# Patient Record
Sex: FEMALE | Race: WHITE | Employment: FULL TIME | URBAN - NONMETROPOLITAN AREA
[De-identification: names, ages, dates, MRNs, and addresses within clinical notes are randomized per-mention and may not be internally consistent; named-entity substitution may affect disease eponyms.]

---

## 2017-04-26 ENCOUNTER — OFFICE VISIT (OUTPATIENT)
Dept: ONCOLOGY | Age: 47
End: 2017-04-26

## 2017-04-26 VITALS
BODY MASS INDEX: 39.62 KG/M2 | WEIGHT: 237.8 LBS | HEIGHT: 65 IN | SYSTOLIC BLOOD PRESSURE: 132 MMHG | RESPIRATION RATE: 18 BRPM | HEART RATE: 97 BPM | OXYGEN SATURATION: 98 % | DIASTOLIC BLOOD PRESSURE: 89 MMHG | TEMPERATURE: 97.7 F

## 2017-04-26 DIAGNOSIS — C73 THYROID CANCER (HCC): Primary | ICD-10-CM

## 2017-04-26 PROCEDURE — 99244 OFF/OP CNSLTJ NEW/EST MOD 40: CPT | Performed by: INTERNAL MEDICINE

## 2017-04-26 RX ORDER — BENAZEPRIL HYDROCHLORIDE AND HYDROCHLOROTHIAZIDE 20; 12.5 MG/1; MG/1
1 TABLET ORAL DAILY
COMMUNITY
Start: 2017-04-15

## 2017-04-26 RX ORDER — OMEPRAZOLE 20 MG/1
20 CAPSULE, DELAYED RELEASE ORAL EVERY OTHER DAY
COMMUNITY
Start: 2017-03-29

## 2017-04-26 RX ORDER — HYDROCODONE BITARTRATE AND ACETAMINOPHEN 5; 325 MG/1; MG/1
TABLET ORAL
COMMUNITY
Start: 2017-04-04 | End: 2017-06-30

## 2017-04-26 RX ORDER — EZETIMIBE 10 MG/1
10 TABLET ORAL DAILY
COMMUNITY
Start: 2017-03-29

## 2017-04-26 RX ORDER — MULTIVIT-MIN/IRON/FOLIC ACID/K 18-600-40
1 CAPSULE ORAL DAILY
COMMUNITY

## 2017-04-26 RX ORDER — LEVOTHYROXINE SODIUM 0.07 MG/1
75 TABLET ORAL DAILY
COMMUNITY
Start: 2017-04-04 | End: 2017-06-30 | Stop reason: SDUPTHER

## 2017-04-26 RX ORDER — CALCIUM CARBONATE 500(1250)
500 TABLET ORAL DAILY
COMMUNITY
End: 2021-11-16 | Stop reason: ALTCHOICE

## 2017-04-26 RX ORDER — M-VIT,TX,IRON,MINS/CALC/FOLIC 27MG-0.4MG
1 TABLET ORAL DAILY
COMMUNITY

## 2017-04-26 RX ORDER — FERROUS SULFATE 325(65) MG
325 TABLET ORAL
COMMUNITY
End: 2017-06-30

## 2017-06-30 ENCOUNTER — OFFICE VISIT (OUTPATIENT)
Dept: ENDOCRINOLOGY | Age: 47
End: 2017-06-30

## 2017-06-30 VITALS
HEIGHT: 65 IN | HEART RATE: 81 BPM | SYSTOLIC BLOOD PRESSURE: 120 MMHG | BODY MASS INDEX: 40.48 KG/M2 | WEIGHT: 243 LBS | RESPIRATION RATE: 16 BRPM | DIASTOLIC BLOOD PRESSURE: 90 MMHG

## 2017-06-30 DIAGNOSIS — E89.0 POSTOPERATIVE HYPOTHYROIDISM: ICD-10-CM

## 2017-06-30 DIAGNOSIS — E78.00 PURE HYPERCHOLESTEROLEMIA: ICD-10-CM

## 2017-06-30 DIAGNOSIS — I10 ESSENTIAL HYPERTENSION: ICD-10-CM

## 2017-06-30 DIAGNOSIS — C73 THYROID CANCER (HCC): Primary | ICD-10-CM

## 2017-06-30 PROCEDURE — 99244 OFF/OP CNSLTJ NEW/EST MOD 40: CPT | Performed by: INTERNAL MEDICINE

## 2017-06-30 RX ORDER — LEVOTHYROXINE SODIUM 0.1 MG/1
100 TABLET ORAL DAILY
Qty: 30 TABLET | Refills: 5 | Status: SHIPPED | OUTPATIENT
Start: 2017-06-30 | End: 2017-07-05 | Stop reason: DRUGHIGH

## 2017-07-05 DIAGNOSIS — E89.0 POSTOPERATIVE HYPOTHYROIDISM: Primary | ICD-10-CM

## 2017-07-10 RX ORDER — LEVOTHYROXINE SODIUM 112 UG/1
112 TABLET ORAL DAILY
Qty: 30 TABLET | Refills: 3 | Status: SHIPPED | OUTPATIENT
Start: 2017-07-10 | End: 2017-08-10 | Stop reason: SDUPTHER

## 2017-08-03 LAB
T4 FREE: 1.2
TSH SERPL DL<=0.05 MIU/L-ACNC: 6.36 UIU/ML

## 2017-08-10 ENCOUNTER — TELEPHONE (OUTPATIENT)
Dept: ENDOCRINOLOGY | Age: 47
End: 2017-08-10

## 2017-08-10 DIAGNOSIS — C73 THYROID CANCER (HCC): Primary | ICD-10-CM

## 2017-08-10 DIAGNOSIS — E89.0 POSTOPERATIVE HYPOTHYROIDISM: ICD-10-CM

## 2017-08-10 DIAGNOSIS — E89.0 POSTOPERATIVE HYPOTHYROIDISM: Primary | ICD-10-CM

## 2017-08-10 RX ORDER — LEVOTHYROXINE SODIUM 137 UG/1
137 TABLET ORAL DAILY
Qty: 30 TABLET | Refills: 5 | Status: SHIPPED | OUTPATIENT
Start: 2017-08-10 | End: 2017-10-19 | Stop reason: SDUPTHER

## 2017-08-22 ENCOUNTER — TELEPHONE (OUTPATIENT)
Dept: ENDOCRINOLOGY | Age: 47
End: 2017-08-22

## 2017-08-22 DIAGNOSIS — E89.0 POSTOPERATIVE HYPOTHYROIDISM: Primary | ICD-10-CM

## 2017-09-27 DIAGNOSIS — C73 THYROID CANCER (HCC): ICD-10-CM

## 2017-10-19 ENCOUNTER — OFFICE VISIT (OUTPATIENT)
Dept: ENDOCRINOLOGY | Age: 47
End: 2017-10-19
Payer: COMMERCIAL

## 2017-10-19 VITALS
WEIGHT: 240.5 LBS | DIASTOLIC BLOOD PRESSURE: 72 MMHG | SYSTOLIC BLOOD PRESSURE: 130 MMHG | HEIGHT: 65 IN | RESPIRATION RATE: 20 BRPM | BODY MASS INDEX: 40.07 KG/M2 | HEART RATE: 70 BPM

## 2017-10-19 DIAGNOSIS — E78.00 PURE HYPERCHOLESTEROLEMIA: ICD-10-CM

## 2017-10-19 DIAGNOSIS — I10 ESSENTIAL HYPERTENSION: ICD-10-CM

## 2017-10-19 DIAGNOSIS — E89.0 POSTOPERATIVE HYPOTHYROIDISM: Primary | ICD-10-CM

## 2017-10-19 DIAGNOSIS — C73 THYROID CANCER (HCC): ICD-10-CM

## 2017-10-19 PROCEDURE — 99214 OFFICE O/P EST MOD 30 MIN: CPT | Performed by: INTERNAL MEDICINE

## 2017-10-19 RX ORDER — LEVOTHYROXINE SODIUM 137 UG/1
TABLET ORAL
Qty: 33 TABLET | Refills: 5 | Status: SHIPPED | OUTPATIENT
Start: 2017-10-19 | End: 2018-01-22 | Stop reason: SDUPTHER

## 2017-10-19 RX ORDER — CETIRIZINE HYDROCHLORIDE 10 MG/1
10 TABLET ORAL DAILY
COMMUNITY

## 2017-10-19 NOTE — LETTER
 KNEE SURGERY Right 09/1998    Lateral Release    MYOMECTOMY  12/2010    SEPTOPLASTY  05/2017    THYROIDECTOMY, COMPLETION  04/03/2017    [facility]    WISDOM TOOTH EXTRACTION  05/2003       Family History   Problem Relation Age of Onset    Diabetes Mother     Heart Disease Mother     Heart Attack Mother     Cancer Father      Esophageal Cancer,Kidney    Prostate Cancer Father     Thyroid Disease Father     High Blood Pressure Brother      Social History   Substance Use Topics    Smoking status: Never Smoker    Smokeless tobacco: Never Used    Alcohol use Yes      Current Outpatient Prescriptions   Medication Sig Dispense Refill    cetirizine (ZYRTEC ALLERGY) 10 MG tablet Take 10 mg by mouth daily      levonorgestrel (MIRENA) IUD 52 mg 1 each by Intrauterine route once      levothyroxine (SYNTHROID) 137 MCG tablet Take 1 tablet daily for 6 days and 1.5 tablets on day 7 33 tablet 5    omeprazole (PRILOSEC) 20 MG delayed release capsule Take 20 mg by mouth Daily       ezetimibe (ZETIA) 10 MG tablet Take 10 mg by mouth daily       benazepril-hydrochlorthiazide (LOTENSIN HCT) 20-12.5 MG per tablet Take 1 tablet by mouth daily       metFORMIN (GLUCOPHAGE) 500 MG tablet Take 500 mg by mouth 3 times daily      Multiple Vitamins-Minerals (THERAPEUTIC MULTIVITAMIN-MINERALS) tablet Take 1 tablet by mouth daily      calcium carbonate (OSCAL) 500 MG TABS tablet Take 500 mg by mouth daily      Cholecalciferol (VITAMIN D) 2000 UNITS CAPS capsule Take 1 capsule by mouth daily       No current facility-administered medications for this visit.       Allergies   Allergen Reactions    Codeine Hives    Statins     Seasonal      Health Maintenance   Topic Date Due    HIV screen  11/19/1987    DTaP/Tdap/Td vaccine (1 - Tdap) 11/19/1991    Cervical cancer screen  11/19/1993    Lipid screen  11/19/2012    Diabetes screen  11/19/2012    Flu vaccine (1) 09/01/2017       Labs  No results found for: Liam Guidry

## 2017-10-19 NOTE — LETTER
Endocrine Diabetes Metabolism [facility]  [address]  [address]  Phone: [phone]  Fax: [phone]    Alexy Donnelly MD      10/19/17    Dr. Cathy Préez    Patient: Johana Boyle  MR Number: [id number]  YOB: 1970  Date of Visit: 10/19/2017      Dear Dr. Cathy Pérez    Thank you for the request for consultation for Johana Boyle to me for the evaluation of   Chief Complaint   Patient presents with    Thyroid Problem     cancer    Hypothyroidism     post-op    Other     hypercholestrolemia    Hypertension    Results     09/07/17, US 09/29/17   . Below are the relevant portions of my assessment and plan of care. Subjective: Follow-up visit for this 30-year-old female being seen for thyroid cancer and hypothyroidism. She also has polycystic ovarian syndrome, hyperlipidemia and hypertension. 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 She did not receive radioactive iodine therapy. She is currently taking 137 µg of Synthroid and has been on this for about 2 months. Patient denies cold intolerance, fatigue and weight gain. There is no constipation, depression or hoarseness of the voice. She denies thyroid pain, choking and hoarseness of the voice. Recent labs were normal with TSH of 1.37. Thyroglobulin was 3.0 in August, when TSH was elevated at 6. Thyroid ultrasound was repeated, with no evidence of residual or recurrent thyroid disease. The patient is still on metformin for polycystic ovarian syndrome. Her periods have been irregular. Since the last visit she went to GYN and had Mirena implanted. She has been sporting since the implantation of the Mirena. Weight has been stable. Patient takes Lotensin for blood pressure.   Past Medical History:   Diagnosis Date    Cancer ([address]     Hypertension     Thyroid disease       Past Surgical History:   Procedure Laterality Date No results found for: EAG  Lab Results   Component Value Date    TSH 6.36 08/03/2017     No results found for: CHOL  No results found for: TRIG  No results found for: HDL  No results found for: LDLCALC, LDLCHOLESTEROL  No results found for: LABVLDL, VLDL  No results found for: CHOLHDLRATIO      Review of Systems  Constitutional: negative for chills and fevers  Eyes: negative for irritation, redness and visual disturbance  Respiratory: negative for cough, shortness of breath and wheezing  Cardiovascular: negative for chest pain, irregular heart beat and palpitations    The remainder of systems were reviewed and negative. Objective:   /72   Pulse 70   Resp 20   Ht 5' 4.96\" (1.65 m)   Wt 240 lb 8 oz (109.1 kg)   BMI 40.07 kg/m²      General:  alert, appears stated age, cooperative and no distress   Oropharynx: normal findings: lips normal without lesions, buccal mucosa normal, gums healthy and tongue midline and normal    Eyes:  negative findings: lids and lashes normal, conjunctivae and sclerae normal, corneas clear and pupils equal, round, reactive to light and accomodation       Neck: no adenopathy, no carotid bruit, no JVD and supple, symmetrical, trachea midline   Thyroid:  Healed thyroidectomy scar. Lung: clear to auscultation bilaterally   Heart:  regular rate and rhythm, S1, S2 normal, no murmur, click, rub or gallop and normal apical impulse   Abdomen: soft, non-tender; bowel sounds normal; no masses,  no organomegaly   Extremities: extremities normal, atraumatic, no cyanosis or edema and Homans sign is negative, no sign of DVT   Pulses: 2+ and symmetric   Skin: warm and dry, no hyperpigmentation, vitiligo, or suspicious lesions   Neuro: normal without focal findings, mental status, speech normal, alert and oriented x3, LAURA, cranial nerves 2-12 intact, muscle tone and strength normal and symmetric.        Assessment/Plan: 1. Postoperative hypothyroidism: Adequately replaced. Medication is well tolerated. 2. Thyroid cancer [facility]): Ultrasound report is reassuring. Thyroglobulin is still measurable and had like to get TSH around 0.5 for long-term control. Patient will now take an extra half tablet of Synthroid once a week. We will then check levels in a month. Patient to let me know if she gets signs or symptoms of thyrotoxicosis. We discussed surveillance. 3. Essential hypertension: Well-controlled. 4. Pure hypercholesterolemia : Patient cannot take statins but she is doing well on Zetia. Lipids and LFTs to be monitored periodically. Orders Placed This Encounter   Procedures    TSH without Reflex     Standing Status:   Future     Standing Expiration Date:   10/19/2018    T4, Free     Standing Status:   Future     Standing Expiration Date:   10/19/2018    TSH without Reflex     Standing Status:   Future     Standing Expiration Date:   10/19/2018    Thyroglobulin and anti-Thyroglobulin AB     Standing Status:   Future     Standing Expiration Date:   10/20/2018    T4, Free     Standing Status:   Future     Standing Expiration Date:   10/19/2018       · The risks and benefits of my recommendations, as well as other treatment options were discussed with the patient today. Questions were answered. · Follow up: 6 months and as needed. If you have questions, please do not hesitate to call me. I look forward to following Alec along with you.     Sincerely,        Americo Fishman MD

## 2017-10-19 NOTE — PROGRESS NOTES
[facility] PROFESSIONAL SERVS  ENDOCRINE DIABETES METABOLISM [facility]  [address]  [address]  [address] [facility]  Dept: [phone]  Dept Fax: [phone]: [phone]    Visit Date: 10/19/2017    Acacia Wong is a 42 y.o. female who presents today for:  Chief Complaint   Patient presents with    Thyroid Problem     cancer    Hypothyroidism     post-op    Other     hypercholestrolemia    Hypertension    Results     09/07/17, US 09/29/17            Subjective: Follow-up visit for this 51-year-old female being seen for thyroid cancer and hypothyroidism. She also has polycystic ovarian syndrome, hyperlipidemia and hypertension. 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 She did not receive radioactive iodine therapy. She is currently taking 137 µg of Synthroid and has been on this for about 2 months. Patient denies cold intolerance, fatigue and weight gain. There is no constipation, depression or hoarseness of the voice. She denies thyroid pain, choking and hoarseness of the voice. Recent labs were normal with TSH of 1.37. Thyroglobulin was 3.0 in August, when TSH was elevated at 6. Thyroid ultrasound was repeated, with no evidence of residual or recurrent thyroid disease. The patient is still on metformin for polycystic ovarian syndrome. Her periods have been irregular. Since the last visit she went to GYN and had Mirena implanted. She has been sporting since the implantation of the Mirena. Weight has been stable. Patient takes Lotensin for blood pressure.   Past Medical History:   Diagnosis Date    Cancer ([address]     Hypertension     Thyroid disease       Past Surgical History:   Procedure Laterality Date    KNEE SURGERY Right 09/1998    Lateral Release    MYOMECTOMY  12/2010    SEPTOPLASTY  05/2017    THYROIDECTOMY, COMPLETION  04/03/2017    [facility]    WISDOM TOOTH EXTRACTION  05/2003       Family History   Problem Relation Age of Onset    Diabetes Mother     Heart Disease Mother     Heart Attack Mother     Cancer Father      Esophageal Cancer,Kidney    Prostate Cancer Father     Thyroid Disease Father     High Blood Pressure Brother      Social History   Substance Use Topics    Smoking status: Never Smoker    Smokeless tobacco: Never Used    Alcohol use Yes      Current Outpatient Prescriptions   Medication Sig Dispense Refill    cetirizine (ZYRTEC ALLERGY) 10 MG tablet Take 10 mg by mouth daily      levonorgestrel (MIRENA) IUD 52 mg 1 each by Intrauterine route once      levothyroxine (SYNTHROID) 137 MCG tablet Take 1 tablet daily for 6 days and 1.5 tablets on day 7 33 tablet 5    omeprazole (PRILOSEC) 20 MG delayed release capsule Take 20 mg by mouth Daily       ezetimibe (ZETIA) 10 MG tablet Take 10 mg by mouth daily       benazepril-hydrochlorthiazide (LOTENSIN HCT) 20-12.5 MG per tablet Take 1 tablet by mouth daily       metFORMIN (GLUCOPHAGE) 500 MG tablet Take 500 mg by mouth 3 times daily      Multiple Vitamins-Minerals (THERAPEUTIC MULTIVITAMIN-MINERALS) tablet Take 1 tablet by mouth daily      calcium carbonate (OSCAL) 500 MG TABS tablet Take 500 mg by mouth daily      Cholecalciferol (VITAMIN D) 2000 UNITS CAPS capsule Take 1 capsule by mouth daily       No current facility-administered medications for this visit.       Allergies   Allergen Reactions    Codeine Hives    Statins     Seasonal      Health Maintenance   Topic Date Due    HIV screen  11/19/1987    DTaP/Tdap/Td vaccine (1 - Tdap) 11/19/1991    Cervical cancer screen  11/19/1993    Lipid screen  11/19/2012    Diabetes screen  11/19/2012    Flu vaccine (1) 09/01/2017       Labs  No results found for: LABA1C  No results found for: EAG  Lab Results   Component Value Date    TSH 6.36 08/03/2017     No results found for: CHOL  No results found for: TRIG  No results found for: HDL  No results found for: LDLCALC, LDLCHOLESTEROL  No results found for: LABVLDL, then check levels in a month. Patient to let me know if she gets signs or symptoms of thyrotoxicosis. We discussed surveillance. 3. Essential hypertension: Well-controlled. 4. Pure hypercholesterolemia : Patient cannot take statins but she is doing well on Zetia. Lipids and LFTs to be monitored periodically. Orders Placed This Encounter   Procedures    TSH without Reflex     Standing Status:   Future     Standing Expiration Date:   10/19/2018    T4, Free     Standing Status:   Future     Standing Expiration Date:   10/19/2018    TSH without Reflex     Standing Status:   Future     Standing Expiration Date:   10/19/2018    Thyroglobulin and anti-Thyroglobulin AB     Standing Status:   Future     Standing Expiration Date:   10/20/2018    T4, Free     Standing Status:   Future     Standing Expiration Date:   10/19/2018       · The risks and benefits of my recommendations, as well as other treatment options were discussed with the patient today. Questions were answered. · Follow up: 6 months and as needed. Electronically signed by Gustavus Paget, MD on 10/19/17 at 4:36 PM    **This report has been created using voice recognition software. It may   contain minor errors which are inherent in voice recognition technology. **

## 2017-10-19 NOTE — LETTER
Endocrine Diabetes Metabolism [facility]  [address]  [address]  Phone: [phone]  Fax: [phone]    Roro Willoughby MD      10/19/17    Dr. Yusuf Us    Patient: Solo Mckinney  MR Number: [id number]  YOB: 1970  Date of Visit: 10/19/2017      Dear Dr. Yusuf Us    Thank you for the request for consultation for Solo Mckinney to me for the evaluation of   Chief Complaint   Patient presents with    Thyroid Problem     cancer    Hypothyroidism     post-op    Other     hypercholestrolemia    Hypertension    Results     09/07/17, US 09/29/17   . Below are the relevant portions of my assessment and plan of care. Subjective: Follow-up visit for this 43-year-old female being seen for thyroid cancer and hypothyroidism. She also has polycystic ovarian syndrome, hyperlipidemia and hypertension. 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 She did not receive radioactive iodine therapy. She is currently taking 137 µg of Synthroid and has been on this for about 2 months. Patient denies cold intolerance, fatigue and weight gain. There is no constipation, depression or hoarseness of the voice. She denies thyroid pain, choking and hoarseness of the voice. Recent labs were normal with TSH of 1.37. Thyroglobulin was 3.0 in August, when TSH was elevated at 6. Thyroid ultrasound was repeated, with no evidence of residual or recurrent thyroid disease. The patient is still on metformin for polycystic ovarian syndrome. Her periods have been irregular. Since the last visit she went to GYN and had Mirena implanted. She has been sporting since the implantation of the Mirena. Weight has been stable. Patient takes Lotensin for blood pressure.   Past Medical History:   Diagnosis Date    Cancer ([address]     Hypertension     Thyroid disease       Past Surgical History:   Procedure Laterality Date  KNEE SURGERY Right 09/1998    Lateral Release    MYOMECTOMY  12/2010    SEPTOPLASTY  05/2017    THYROIDECTOMY, COMPLETION  04/03/2017    [facility]    WISDOM TOOTH EXTRACTION  05/2003       Family History   Problem Relation Age of Onset    Diabetes Mother     Heart Disease Mother     Heart Attack Mother     Cancer Father      Esophageal Cancer,Kidney    Prostate Cancer Father     Thyroid Disease Father     High Blood Pressure Brother      Social History   Substance Use Topics    Smoking status: Never Smoker    Smokeless tobacco: Never Used    Alcohol use Yes      Current Outpatient Prescriptions   Medication Sig Dispense Refill    cetirizine (ZYRTEC ALLERGY) 10 MG tablet Take 10 mg by mouth daily      levonorgestrel (MIRENA) IUD 52 mg 1 each by Intrauterine route once      levothyroxine (SYNTHROID) 137 MCG tablet Take 1 tablet daily for 6 days and 1.5 tablets on day 7 33 tablet 5    omeprazole (PRILOSEC) 20 MG delayed release capsule Take 20 mg by mouth Daily       ezetimibe (ZETIA) 10 MG tablet Take 10 mg by mouth daily       benazepril-hydrochlorthiazide (LOTENSIN HCT) 20-12.5 MG per tablet Take 1 tablet by mouth daily       metFORMIN (GLUCOPHAGE) 500 MG tablet Take 500 mg by mouth 3 times daily      Multiple Vitamins-Minerals (THERAPEUTIC MULTIVITAMIN-MINERALS) tablet Take 1 tablet by mouth daily      calcium carbonate (OSCAL) 500 MG TABS tablet Take 500 mg by mouth daily      Cholecalciferol (VITAMIN D) 2000 UNITS CAPS capsule Take 1 capsule by mouth daily       No current facility-administered medications for this visit.       Allergies   Allergen Reactions    Codeine Hives    Statins     Seasonal      Health Maintenance   Topic Date Due    HIV screen  11/19/1987    DTaP/Tdap/Td vaccine (1 - Tdap) 11/19/1991    Cervical cancer screen  11/19/1993    Lipid screen  11/19/2012    Diabetes screen  11/19/2012    Flu vaccine (1) 09/01/2017       Labs  No results found for: Abhilash Mackenzie

## 2018-01-22 ENCOUNTER — OFFICE VISIT (OUTPATIENT)
Dept: ONCOLOGY | Age: 48
End: 2018-01-22
Payer: COMMERCIAL

## 2018-01-22 ENCOUNTER — HOSPITAL ENCOUNTER (OUTPATIENT)
Dept: INFUSION THERAPY | Age: 48
Discharge: HOME | End: 2018-01-22
Payer: COMMERCIAL

## 2018-01-22 VITALS
HEART RATE: 90 BPM | BODY MASS INDEX: 40.62 KG/M2 | HEIGHT: 65 IN | DIASTOLIC BLOOD PRESSURE: 96 MMHG | OXYGEN SATURATION: 98 % | TEMPERATURE: 98.2 F | WEIGHT: 243.8 LBS | SYSTOLIC BLOOD PRESSURE: 145 MMHG | RESPIRATION RATE: 18 BRPM

## 2018-01-22 DIAGNOSIS — E89.0 POSTOPERATIVE HYPOTHYROIDISM: ICD-10-CM

## 2018-01-22 DIAGNOSIS — C73 THYROID CANCER (HCC): ICD-10-CM

## 2018-01-22 DIAGNOSIS — I10 HYPERTENSION, UNSPECIFIED TYPE: Primary | ICD-10-CM

## 2018-01-22 LAB
T4 FREE: 1.59 NG/DL (ref 0.93–1.76)
TSH SERPL DL<=0.05 MIU/L-ACNC: 0.32 UIU/ML (ref 0.4–4.2)

## 2018-01-22 PROCEDURE — 84439 ASSAY OF FREE THYROXINE: CPT

## 2018-01-22 PROCEDURE — 84432 ASSAY OF THYROGLOBULIN: CPT

## 2018-01-22 PROCEDURE — 36415 COLL VENOUS BLD VENIPUNCTURE: CPT

## 2018-01-22 PROCEDURE — 84443 ASSAY THYROID STIM HORMONE: CPT

## 2018-01-22 PROCEDURE — 99213 OFFICE O/P EST LOW 20 MIN: CPT | Performed by: INTERNAL MEDICINE

## 2018-01-22 PROCEDURE — 99211 OFF/OP EST MAY X REQ PHY/QHP: CPT

## 2018-01-22 RX ORDER — LEVOTHYROXINE SODIUM 137 UG/1
TABLET ORAL
Qty: 33 TABLET | Refills: 5 | Status: SHIPPED | OUTPATIENT
Start: 2018-01-22 | End: 2018-04-17 | Stop reason: SDUPTHER

## 2018-01-27 LAB — THYROGLOBULIN: 1.4 NG/ML (ref 1.3–31.8)

## 2018-03-26 LAB
T4 FREE: 1.48
TSH SERPL DL<=0.05 MIU/L-ACNC: 1.89 UIU/ML

## 2018-04-17 ENCOUNTER — OFFICE VISIT (OUTPATIENT)
Dept: ENDOCRINOLOGY | Age: 48
End: 2018-04-17
Payer: COMMERCIAL

## 2018-04-17 VITALS
WEIGHT: 246 LBS | DIASTOLIC BLOOD PRESSURE: 77 MMHG | RESPIRATION RATE: 16 BRPM | HEART RATE: 96 BPM | BODY MASS INDEX: 40.98 KG/M2 | SYSTOLIC BLOOD PRESSURE: 137 MMHG | HEIGHT: 65 IN

## 2018-04-17 DIAGNOSIS — C73 THYROID CANCER (HCC): Primary | ICD-10-CM

## 2018-04-17 DIAGNOSIS — E89.0 POSTOPERATIVE HYPOTHYROIDISM: ICD-10-CM

## 2018-04-17 PROCEDURE — 99214 OFFICE O/P EST MOD 30 MIN: CPT | Performed by: INTERNAL MEDICINE

## 2018-04-17 RX ORDER — LEVOTHYROXINE SODIUM 137 UG/1
TABLET ORAL
Qty: 35 TABLET | Refills: 5 | Status: SHIPPED | OUTPATIENT
Start: 2018-04-17 | End: 2021-04-08 | Stop reason: SDUPTHER

## 2018-04-17 ASSESSMENT — ENCOUNTER SYMPTOMS
HEARTBURN: 0
NAUSEA: 0
WHEEZING: 0
SHORTNESS OF BREATH: 0
SORE THROAT: 0
VOMITING: 0

## 2018-04-23 ENCOUNTER — HOSPITAL ENCOUNTER (OUTPATIENT)
Dept: INFUSION THERAPY | Age: 48
Discharge: HOME | End: 2018-04-23
Payer: COMMERCIAL

## 2018-04-23 ENCOUNTER — OFFICE VISIT (OUTPATIENT)
Dept: ONCOLOGY | Age: 48
End: 2018-04-23
Payer: COMMERCIAL

## 2018-04-23 VITALS
DIASTOLIC BLOOD PRESSURE: 85 MMHG | HEIGHT: 65 IN | OXYGEN SATURATION: 98 % | TEMPERATURE: 98.1 F | RESPIRATION RATE: 18 BRPM | WEIGHT: 247 LBS | HEART RATE: 102 BPM | BODY MASS INDEX: 41.15 KG/M2 | SYSTOLIC BLOOD PRESSURE: 134 MMHG

## 2018-04-23 DIAGNOSIS — C73 THYROID CANCER (HCC): ICD-10-CM

## 2018-04-23 DIAGNOSIS — C73 THYROID CANCER (HCC): Primary | ICD-10-CM

## 2018-04-23 DIAGNOSIS — E89.0 POSTOPERATIVE HYPOTHYROIDISM: ICD-10-CM

## 2018-04-23 LAB
T4 FREE: 1.53 NG/DL (ref 0.93–1.76)
TSH SERPL DL<=0.05 MIU/L-ACNC: 0.54 UIU/ML (ref 0.4–4.2)

## 2018-04-23 PROCEDURE — 84439 ASSAY OF FREE THYROXINE: CPT

## 2018-04-23 PROCEDURE — 99213 OFFICE O/P EST LOW 20 MIN: CPT | Performed by: INTERNAL MEDICINE

## 2018-04-23 PROCEDURE — 84443 ASSAY THYROID STIM HORMONE: CPT

## 2018-04-23 PROCEDURE — 99211 OFF/OP EST MAY X REQ PHY/QHP: CPT

## 2018-04-23 PROCEDURE — 84432 ASSAY OF THYROGLOBULIN: CPT

## 2018-04-23 PROCEDURE — 1036F TOBACCO NON-USER: CPT | Performed by: INTERNAL MEDICINE

## 2018-04-23 PROCEDURE — G8417 CALC BMI ABV UP PARAM F/U: HCPCS | Performed by: INTERNAL MEDICINE

## 2018-04-23 PROCEDURE — 36415 COLL VENOUS BLD VENIPUNCTURE: CPT

## 2018-04-23 PROCEDURE — G8427 DOCREV CUR MEDS BY ELIG CLIN: HCPCS | Performed by: INTERNAL MEDICINE

## 2018-04-25 ENCOUNTER — HOSPITAL ENCOUNTER (OUTPATIENT)
Dept: ULTRASOUND IMAGING | Age: 48
Discharge: HOME | End: 2018-04-25
Payer: COMMERCIAL

## 2018-04-25 DIAGNOSIS — E89.0 POSTOPERATIVE HYPOTHYROIDISM: ICD-10-CM

## 2018-04-25 DIAGNOSIS — C73 THYROID CANCER (HCC): ICD-10-CM

## 2018-04-25 PROCEDURE — 76536 US EXAM OF HEAD AND NECK: CPT

## 2018-04-26 ENCOUNTER — TELEPHONE (OUTPATIENT)
Dept: INTERNAL MEDICINE CLINIC | Age: 48
End: 2018-04-26

## 2018-04-26 DIAGNOSIS — C73 THYROID CANCER (HCC): Primary | ICD-10-CM

## 2018-04-29 LAB — THYROGLOBULIN: 1.8 NG/ML (ref 1.3–31.8)

## 2018-05-01 ENCOUNTER — TELEPHONE (OUTPATIENT)
Dept: INTERNAL MEDICINE CLINIC | Age: 48
End: 2018-05-01

## 2018-05-10 ENCOUNTER — HOSPITAL ENCOUNTER (OUTPATIENT)
Dept: ULTRASOUND IMAGING | Age: 48
Discharge: HOME | End: 2018-05-10
Payer: COMMERCIAL

## 2018-05-10 DIAGNOSIS — C73 THYROID CANCER (HCC): ICD-10-CM

## 2018-05-10 PROCEDURE — 76536 US EXAM OF HEAD AND NECK: CPT

## 2018-05-15 ENCOUNTER — TELEPHONE (OUTPATIENT)
Dept: ENDOCRINOLOGY | Age: 48
End: 2018-05-15

## 2018-05-21 LAB
T4 FREE: 1.36
TSH SERPL DL<=0.05 MIU/L-ACNC: 1.16 UIU/ML

## 2018-07-23 ENCOUNTER — HOSPITAL ENCOUNTER (OUTPATIENT)
Dept: INFUSION THERAPY | Age: 48
Discharge: HOME | End: 2018-07-23
Payer: COMMERCIAL

## 2018-07-23 ENCOUNTER — OFFICE VISIT (OUTPATIENT)
Dept: ONCOLOGY | Age: 48
End: 2018-07-23
Payer: COMMERCIAL

## 2018-07-23 VITALS
HEIGHT: 65 IN | SYSTOLIC BLOOD PRESSURE: 118 MMHG | BODY MASS INDEX: 40.75 KG/M2 | HEART RATE: 82 BPM | DIASTOLIC BLOOD PRESSURE: 78 MMHG | RESPIRATION RATE: 16 BRPM | OXYGEN SATURATION: 95 % | WEIGHT: 244.6 LBS | TEMPERATURE: 98 F

## 2018-07-23 DIAGNOSIS — C73 THYROID CANCER (HCC): ICD-10-CM

## 2018-07-23 DIAGNOSIS — C73 THYROID CANCER (HCC): Primary | ICD-10-CM

## 2018-07-23 LAB — TSH SERPL DL<=0.05 MIU/L-ACNC: 0.04 UIU/ML (ref 0.4–4.2)

## 2018-07-23 PROCEDURE — 84436 ASSAY OF TOTAL THYROXINE: CPT

## 2018-07-23 PROCEDURE — G8427 DOCREV CUR MEDS BY ELIG CLIN: HCPCS | Performed by: INTERNAL MEDICINE

## 2018-07-23 PROCEDURE — G8417 CALC BMI ABV UP PARAM F/U: HCPCS | Performed by: INTERNAL MEDICINE

## 2018-07-23 PROCEDURE — 99213 OFFICE O/P EST LOW 20 MIN: CPT | Performed by: INTERNAL MEDICINE

## 2018-07-23 PROCEDURE — 36415 COLL VENOUS BLD VENIPUNCTURE: CPT

## 2018-07-23 PROCEDURE — 84443 ASSAY THYROID STIM HORMONE: CPT

## 2018-07-23 PROCEDURE — 99211 OFF/OP EST MAY X REQ PHY/QHP: CPT

## 2018-07-23 PROCEDURE — 1036F TOBACCO NON-USER: CPT | Performed by: INTERNAL MEDICINE

## 2018-07-23 PROCEDURE — 84432 ASSAY OF THYROGLOBULIN: CPT

## 2018-07-24 LAB — THYROXINE (T4): 12.5 UG/DL (ref 4.5–12)

## 2018-07-28 LAB — THYROGLOBULIN: 1.6 NG/ML (ref 1.3–31.8)

## 2018-08-14 ENCOUNTER — HOSPITAL ENCOUNTER (OUTPATIENT)
Dept: ULTRASOUND IMAGING | Age: 48
Discharge: HOME | End: 2018-08-14
Payer: COMMERCIAL

## 2018-08-14 DIAGNOSIS — C73 MALIGNANT NEOPLASM OF THYROID GLAND (HCC): ICD-10-CM

## 2018-08-14 PROCEDURE — 76536 US EXAM OF HEAD AND NECK: CPT

## 2018-11-12 ENCOUNTER — HOSPITAL ENCOUNTER (OUTPATIENT)
Dept: INFUSION THERAPY | Age: 48
Discharge: HOME | End: 2018-11-12
Payer: COMMERCIAL

## 2018-11-12 ENCOUNTER — OFFICE VISIT (OUTPATIENT)
Dept: ONCOLOGY | Age: 48
End: 2018-11-12
Payer: COMMERCIAL

## 2018-11-12 VITALS
WEIGHT: 244.6 LBS | HEIGHT: 65 IN | SYSTOLIC BLOOD PRESSURE: 118 MMHG | HEART RATE: 85 BPM | TEMPERATURE: 98.4 F | DIASTOLIC BLOOD PRESSURE: 76 MMHG | BODY MASS INDEX: 40.75 KG/M2 | OXYGEN SATURATION: 98 % | RESPIRATION RATE: 18 BRPM

## 2018-11-12 DIAGNOSIS — C73 THYROID CANCER (HCC): ICD-10-CM

## 2018-11-12 DIAGNOSIS — C73 THYROID CANCER (HCC): Primary | ICD-10-CM

## 2018-11-12 LAB
BASOPHILS # BLD: 0.9 %
BASOPHILS ABSOLUTE: 0.1 THOU/MM3 (ref 0–0.1)
EOSINOPHIL # BLD: 3 %
EOSINOPHILS ABSOLUTE: 0.3 THOU/MM3 (ref 0–0.4)
HCT VFR BLD CALC: 38.9 % (ref 37–47)
HEMOGLOBIN: 12.8 GM/DL (ref 12–16)
IMMATURE GRANS (ABS): 0.04 THOU/MM3 (ref 0–0.07)
IMMATURE GRANULOCYTES: 0.4 %
LYMPHOCYTES # BLD: 33.5 %
LYMPHOCYTES ABSOLUTE: 2.9 THOU/MM3 (ref 1–4.8)
MCH RBC QN AUTO: 26.4 PG (ref 27–31)
MCHC RBC AUTO-ENTMCNC: 32.9 GM/DL (ref 33–37)
MCV RBC AUTO: 80 FL (ref 81–99)
MONOCYTES # BLD: 7 %
MONOCYTES ABSOLUTE: 0.6 THOU/MM3 (ref 0.4–1.3)
NUCLEATED RED BLOOD CELLS: 0 /100 WBC
PDW BLD-RTO: 14.1 % (ref 11.5–14.5)
PLATELET # BLD: 406 THOU/MM3 (ref 130–400)
PMV BLD AUTO: 8.3 FL (ref 7.4–10.4)
RBC # BLD: 4.83 MILL/MM3 (ref 4.2–5.4)
SEG NEUTROPHILS: 55.2 %
SEGMENTED NEUTROPHILS ABSOLUTE COUNT: 4.8 THOU/MM3 (ref 1.8–7.7)
WBC # BLD: 8.7 THOU/MM3 (ref 4.8–10.8)

## 2018-11-12 PROCEDURE — 84432 ASSAY OF THYROGLOBULIN: CPT

## 2018-11-12 PROCEDURE — 85025 COMPLETE CBC W/AUTO DIFF WBC: CPT

## 2018-11-12 PROCEDURE — G8427 DOCREV CUR MEDS BY ELIG CLIN: HCPCS | Performed by: INTERNAL MEDICINE

## 2018-11-12 PROCEDURE — G8484 FLU IMMUNIZE NO ADMIN: HCPCS | Performed by: INTERNAL MEDICINE

## 2018-11-12 PROCEDURE — 36415 COLL VENOUS BLD VENIPUNCTURE: CPT

## 2018-11-12 PROCEDURE — 99211 OFF/OP EST MAY X REQ PHY/QHP: CPT

## 2018-11-12 PROCEDURE — 1036F TOBACCO NON-USER: CPT | Performed by: INTERNAL MEDICINE

## 2018-11-12 PROCEDURE — 99213 OFFICE O/P EST LOW 20 MIN: CPT | Performed by: INTERNAL MEDICINE

## 2018-11-12 PROCEDURE — G8417 CALC BMI ABV UP PARAM F/U: HCPCS | Performed by: INTERNAL MEDICINE

## 2018-11-18 LAB — THYROGLOBULIN: 1.8 NG/ML (ref 1.3–31.8)

## 2018-11-18 NOTE — PROGRESS NOTES
and allergy list as noted on the electronic medical record. The PMH, SH and FH were also reviewed as noted on the EMR. Review of Systems  Constitutional: Negative. HENT: Negative. Eyes: Negative. Respiratory: Negative. Cardiovascular: Negative. Gastrointestinal: Negative. Genitourinary: Negative. Musculoskeletal: Negative. Skin: Negative. Neurological: Negative. Hematological: Negative. Psychiatric/Behavioral: Negative. Objective:   Physical Exam  Vitals:    11/12/18 1518   BP: 118/76   Pulse: 85   Resp: 18   Temp: 98.4 °F (36.9 °C)   SpO2: 98%   Vitals reviewed and are stable. Constitutional: Well-developed and well-nourished. No acute distress. HENT: Normocephalic and atraumatic. Eyes: Pupils are equal and reactive. No scleral icterus. Neck: Overall appearance is symmetrical. No identifiable masses. Chest: Inspection and palpation of chest is normal.  Pulmonary: Effort normal. No respiratory distress. Cardiovascular: RRR. No edema in any of the four extremities. Abdominal: Soft. No hepatomegaly or splenomegaly. Musculoskeletal: Gait is normal. Muscle strength and tone good. Neurological: Alert and oriented to person, place, and time. Judgment and thought content normal.  Skin: Skin is warm and dry. No rash. Psychiatric: Mood and affect appropriate for the clinical situation. Behavior is normal.        Data Analysis:    Hematology f11/12/2018   WBC 8.7   RBC 4.83   HGB 12.8   HCT 38.9   MCV 80 (L)   RDW 14.1    (H)     Assessment:   1. Papillary thyroid carcinoma - stage I.  2.  Thrombocytosis. Recommendations:   1. Monitor for recurrence of malignancy. 2.  Monitor platelet count and for any signs of abnormal bleeding/bruising.      Yanira Roberson [address] [facility] [facility], [address] [place] [address]

## 2019-09-11 ENCOUNTER — HOSPITAL ENCOUNTER (OUTPATIENT)
Dept: ULTRASOUND IMAGING | Age: 49
Discharge: HOME | End: 2019-09-11
Payer: COMMERCIAL

## 2019-09-11 DIAGNOSIS — C73 MALIGNANT NEOPLASM OF THYROID GLAND (HCC): ICD-10-CM

## 2019-09-11 PROCEDURE — 76536 US EXAM OF HEAD AND NECK: CPT

## 2019-11-12 ENCOUNTER — HOSPITAL ENCOUNTER (OUTPATIENT)
Dept: INFUSION THERAPY | Age: 49
Discharge: HOME | End: 2019-11-12
Payer: COMMERCIAL

## 2019-11-12 ENCOUNTER — OFFICE VISIT (OUTPATIENT)
Dept: ONCOLOGY | Age: 49
End: 2019-11-12
Payer: COMMERCIAL

## 2019-11-12 VITALS
TEMPERATURE: 99 F | BODY MASS INDEX: 35.96 KG/M2 | HEART RATE: 88 BPM | RESPIRATION RATE: 16 BRPM | HEIGHT: 65 IN | OXYGEN SATURATION: 97 % | WEIGHT: 215.8 LBS | DIASTOLIC BLOOD PRESSURE: 96 MMHG | SYSTOLIC BLOOD PRESSURE: 138 MMHG

## 2019-11-12 DIAGNOSIS — C73 THYROID CANCER (HCC): ICD-10-CM

## 2019-11-12 DIAGNOSIS — C73 THYROID CANCER (HCC): Primary | ICD-10-CM

## 2019-11-12 LAB
ALBUMIN SERPL-MCNC: 4.8 G/DL (ref 3.5–5.1)
ALP BLD-CCNC: 72 U/L (ref 38–126)
ALT SERPL-CCNC: 13 U/L (ref 11–66)
ANION GAP SERPL CALCULATED.3IONS-SCNC: 16 MEQ/L (ref 8–16)
AST SERPL-CCNC: 13 U/L (ref 5–40)
BASOPHILS # BLD: 0.6 %
BASOPHILS ABSOLUTE: 0.1 THOU/MM3 (ref 0–0.1)
BILIRUB SERPL-MCNC: 0.6 MG/DL (ref 0.3–1.2)
BILIRUBIN DIRECT: < 0.2 MG/DL (ref 0–0.3)
BUN BLDV-MCNC: 13 MG/DL (ref 7–22)
CALCIUM SERPL-MCNC: 9.6 MG/DL (ref 8.5–10.5)
CHLORIDE BLD-SCNC: 96 MEQ/L (ref 98–111)
CO2: 24 MEQ/L (ref 23–33)
CREAT SERPL-MCNC: 0.6 MG/DL (ref 0.4–1.2)
EOSINOPHIL # BLD: 1.1 %
EOSINOPHILS ABSOLUTE: 0.1 THOU/MM3 (ref 0–0.4)
GFR SERPL CREATININE-BSD FRML MDRD: > 90 ML/MIN/1.73M2
GLUCOSE BLD-MCNC: 85 MG/DL (ref 70–108)
HCT VFR BLD CALC: 38.7 % (ref 37–47)
HEMOGLOBIN: 12.3 GM/DL (ref 12–16)
IMMATURE GRANS (ABS): 0.04 THOU/MM3 (ref 0–0.07)
IMMATURE GRANULOCYTES: 0.4 %
LYMPHOCYTES # BLD: 26 %
LYMPHOCYTES ABSOLUTE: 2.5 THOU/MM3 (ref 1–4.8)
MCH RBC QN AUTO: 26.7 PG (ref 27–31)
MCHC RBC AUTO-ENTMCNC: 31.9 GM/DL (ref 33–37)
MCV RBC AUTO: 84 FL (ref 81–99)
MONOCYTES # BLD: 6.3 %
MONOCYTES ABSOLUTE: 0.6 THOU/MM3 (ref 0.4–1.3)
NUCLEATED RED BLOOD CELLS: 0 /100 WBC
PDW BLD-RTO: 14.4 % (ref 11.5–14.5)
PLATELET # BLD: 364 THOU/MM3 (ref 130–400)
PMV BLD AUTO: 8.1 FL (ref 7.4–10.4)
POTASSIUM SERPL-SCNC: 3.5 MEQ/L (ref 3.5–5.2)
RBC # BLD: 4.62 MILL/MM3 (ref 4.2–5.4)
SEG NEUTROPHILS: 65.6 %
SEGMENTED NEUTROPHILS ABSOLUTE COUNT: 6.3 THOU/MM3 (ref 1.8–7.7)
SODIUM BLD-SCNC: 136 MEQ/L (ref 135–145)
TOTAL PROTEIN: 8.1 G/DL (ref 6.1–8)
TSH SERPL DL<=0.05 MIU/L-ACNC: 1.69 UIU/ML (ref 0.4–4.2)
WBC # BLD: 9.6 THOU/MM3 (ref 4.8–10.8)

## 2019-11-12 PROCEDURE — 85025 COMPLETE CBC W/AUTO DIFF WBC: CPT

## 2019-11-12 PROCEDURE — 36415 COLL VENOUS BLD VENIPUNCTURE: CPT

## 2019-11-12 PROCEDURE — 80053 COMPREHEN METABOLIC PANEL: CPT

## 2019-11-12 PROCEDURE — 84432 ASSAY OF THYROGLOBULIN: CPT

## 2019-11-12 PROCEDURE — 99211 OFF/OP EST MAY X REQ PHY/QHP: CPT

## 2019-11-12 PROCEDURE — 84436 ASSAY OF TOTAL THYROXINE: CPT

## 2019-11-12 PROCEDURE — 84443 ASSAY THYROID STIM HORMONE: CPT

## 2019-11-12 PROCEDURE — 99213 OFFICE O/P EST LOW 20 MIN: CPT | Performed by: INTERNAL MEDICINE

## 2019-11-12 PROCEDURE — 82248 BILIRUBIN DIRECT: CPT

## 2019-11-13 LAB — THYROXINE (T4): 10.1 UG/DL (ref 4.5–12)

## 2019-11-23 LAB — THYROGLOBULIN: 2.6 NG/ML (ref 1.3–31.8)

## 2020-03-23 ENCOUNTER — HOSPITAL ENCOUNTER (OUTPATIENT)
Dept: NUCLEAR MEDICINE | Age: 50
Discharge: HOME | End: 2020-03-23
Payer: COMMERCIAL

## 2020-03-23 ENCOUNTER — APPOINTMENT (OUTPATIENT)
Dept: NUCLEAR MEDICINE | Age: 50
End: 2020-03-23
Payer: COMMERCIAL

## 2020-03-23 PROCEDURE — 6360000002 HC RX W HCPCS: Performed by: INTERNAL MEDICINE

## 2020-03-23 PROCEDURE — 78018 THYROID MET IMAGING BODY: CPT

## 2020-03-23 RX ADMIN — THYROTROPIN ALFA 0.9 MG: 0.9 INJECTION, POWDER, FOR SOLUTION INTRAMUSCULAR at 07:19

## 2020-03-24 ENCOUNTER — HOSPITAL ENCOUNTER (OUTPATIENT)
Dept: ULTRASOUND IMAGING | Age: 50
Discharge: HOME | End: 2020-03-24
Payer: COMMERCIAL

## 2020-03-24 ENCOUNTER — HOSPITAL ENCOUNTER (OUTPATIENT)
Dept: NUCLEAR MEDICINE | Age: 50
Discharge: HOME | End: 2020-03-24
Payer: COMMERCIAL

## 2020-03-24 PROCEDURE — 3209999900 US COMPARISON OF OUTSIDE FILMS

## 2020-03-24 PROCEDURE — 6360000002 HC RX W HCPCS: Performed by: INTERNAL MEDICINE

## 2020-03-24 RX ADMIN — THYROTROPIN ALFA 0.9 MG: 0.9 INJECTION, POWDER, FOR SOLUTION INTRAMUSCULAR at 07:25

## 2020-03-25 ENCOUNTER — HOSPITAL ENCOUNTER (OUTPATIENT)
Dept: NUCLEAR MEDICINE | Age: 50
Discharge: HOME | End: 2020-03-25
Payer: COMMERCIAL

## 2020-03-25 PROCEDURE — A9509 IODINE I-123 SOD IODIDE MIL: HCPCS | Performed by: INTERNAL MEDICINE

## 2020-03-25 PROCEDURE — 3430000000 HC RX DIAGNOSTIC RADIOPHARMACEUTICAL: Performed by: INTERNAL MEDICINE

## 2020-03-25 RX ADMIN — Medication 2.4 MILLICURIE: at 07:00

## 2020-03-26 ENCOUNTER — HOSPITAL ENCOUNTER (OUTPATIENT)
Dept: NUCLEAR MEDICINE | Age: 50
Discharge: HOME | End: 2020-03-26
Payer: COMMERCIAL

## 2020-03-27 ENCOUNTER — HOSPITAL ENCOUNTER (OUTPATIENT)
Dept: NUCLEAR MEDICINE | Age: 50
Discharge: HOME | End: 2020-03-27
Payer: COMMERCIAL

## 2020-11-05 NOTE — PROGRESS NOTES
[facility]  CANCER NETWORK OF [facility]  [facility] [address] [facility] [address]  [address] [facility] [address]  [address]  Dept: [phone]  Dept Fax: [phone]  Loc: [phone]    Subjective:      Chief Complaint: Balaji Rodas is a 54 y.o. female. The patient noticed a thyroid nodule and sought medical care. She first noticed the nodule in August 2016. The nodule was located in the left thyroid. The patient had no other associated sings and symptoms related to the thyroid nodule. In the context of this condition, she was also noted to have normal thyroid function. The patient had an ultrasound which confirmed the left nodule. She underwent a aspiration and was found to have a follicular thyroid. An ultrasound was completed on March 1, 2017 which found a large thyroid adenoma in the left lower thyroid which had slightly increased in size as compared to August 2016. There were 2 smaller nodules noted in the right thyroid which were well-defined and likely represented colloid nodules. The patient was seen by Dr. Jillian Cook and on April 3, 2017 she underwent a total thyroidectomy. Surgical pathology confirmed papillary thyroid carcinoma involving the left thyroid. The maximum tumor size was 5 mm. Margins were free of any neoplasia and she was also noted to have a multinodular goiter. One lymph node was negative for malignancy    HPI: Jillian Robertson is here today for follow-up regarding history of thyroid cancer. Her general sense of wellbeing has been good. She has no signs or symptoms that be suggestive recurrence of her malignancy. Patient continues to have her thyroid function monitored closely. She has not had fever, cough, shortness of breath or other signs of infection. The patient's bowel and bladder habits have been normal.  She has not seen blood in her stool or urine.   The patient remains active with an ECOG performance status of level 0. PMH, SH, and FH:  I reviewed the patients medication list and allergy list as noted on the electronic medical record. The PMH, SH and FH were also reviewed as noted on the EMR. Review of Systems  Constitutional: Negative. HENT: Negative. Eyes: Negative. Respiratory: Negative. Cardiovascular: Negative. Gastrointestinal: Negative. Genitourinary: Negative. Musculoskeletal: Negative. Skin: Negative. Neurological: Negative. Hematological: Negative. Psychiatric/Behavioral: Negative. Objective:   Physical Exam  Vitals:    11/10/20 1510   BP: (!) 145/86   Pulse: 82   Resp: 18   Temp: 99.2 °F (37.3 °C)   SpO2: 98%   Vitals reviewed and are stable. Constitutional: Well-developed. No acute distress. HENT: Normocephalic and atraumatic. Eyes: Pupils appear equal and reactive. Neck: Overall appearance is symmetrical. No identifiable masses. Pulmonary: Effort normal. No respiratory distress. .  Neurological: Alert and oriented to person, place, and time. Judgment and thought content normal.  Skin: Skin is warm and dry. No rash. Psychiatric: Mood and affect appropriate for the clinical situation. Assessment:   1. Papillary thyroid carcinoma - stage I. Recommendations:   1. Monitor for recurrence of malignancy. 2.  Monitor thyroid function. Kiran Valladares M.D. Medical Director: [facility]  Cancer [address], [address] [place] [address] of [facility] at [facility]      **This report has been created using voice recognition software.   It may contain minor errors which are inherent in voice recognition

## 2020-11-10 ENCOUNTER — OFFICE VISIT (OUTPATIENT)
Dept: ONCOLOGY | Age: 50
End: 2020-11-10
Payer: COMMERCIAL

## 2020-11-10 ENCOUNTER — HOSPITAL ENCOUNTER (OUTPATIENT)
Dept: INFUSION THERAPY | Age: 50
Discharge: HOME | End: 2020-11-10
Payer: COMMERCIAL

## 2020-11-10 VITALS
SYSTOLIC BLOOD PRESSURE: 145 MMHG | RESPIRATION RATE: 18 BRPM | HEART RATE: 82 BPM | WEIGHT: 232.2 LBS | DIASTOLIC BLOOD PRESSURE: 86 MMHG | OXYGEN SATURATION: 98 % | HEIGHT: 65 IN | TEMPERATURE: 99.2 F | BODY MASS INDEX: 38.69 KG/M2

## 2020-11-10 DIAGNOSIS — C73 THYROID CANCER (HCC): ICD-10-CM

## 2020-11-10 PROCEDURE — 99213 OFFICE O/P EST LOW 20 MIN: CPT | Performed by: INTERNAL MEDICINE

## 2020-11-10 PROCEDURE — 84432 ASSAY OF THYROGLOBULIN: CPT

## 2020-11-10 PROCEDURE — 99211 OFF/OP EST MAY X REQ PHY/QHP: CPT

## 2020-11-10 PROCEDURE — 84436 ASSAY OF TOTAL THYROXINE: CPT

## 2020-11-10 PROCEDURE — 36415 COLL VENOUS BLD VENIPUNCTURE: CPT

## 2020-11-10 PROCEDURE — 84443 ASSAY THYROID STIM HORMONE: CPT

## 2020-11-11 LAB
THYROXINE (T4): 10.9 UG/DL (ref 4.5–10.9)
TSH SERPL DL<=0.05 MIU/L-ACNC: 0.41 UIU/ML (ref 0.4–4.2)

## 2020-11-15 LAB — THYROGLOBULIN: 1.6 NG/ML (ref 1.3–31.8)

## 2021-04-08 DIAGNOSIS — C73 THYROID CANCER (HCC): ICD-10-CM

## 2021-04-08 DIAGNOSIS — E89.0 POSTOPERATIVE HYPOTHYROIDISM: ICD-10-CM

## 2021-04-08 RX ORDER — LEVOTHYROXINE SODIUM 137 UG/1
TABLET ORAL
Qty: 90 TABLET | Refills: 3 | Status: SHIPPED | OUTPATIENT
Start: 2021-04-08 | End: 2021-11-16 | Stop reason: SDUPTHER

## 2021-11-16 ENCOUNTER — HOSPITAL ENCOUNTER (OUTPATIENT)
Dept: INFUSION THERAPY | Age: 51
Discharge: HOME | End: 2021-11-16
Payer: COMMERCIAL

## 2021-11-16 ENCOUNTER — OFFICE VISIT (OUTPATIENT)
Dept: ONCOLOGY | Age: 51
End: 2021-11-16
Payer: COMMERCIAL

## 2021-11-16 VITALS
DIASTOLIC BLOOD PRESSURE: 86 MMHG | RESPIRATION RATE: 16 BRPM | TEMPERATURE: 98.8 F | HEART RATE: 87 BPM | WEIGHT: 235.2 LBS | BODY MASS INDEX: 39.18 KG/M2 | HEIGHT: 65 IN | OXYGEN SATURATION: 96 % | SYSTOLIC BLOOD PRESSURE: 150 MMHG

## 2021-11-16 DIAGNOSIS — C73 THYROID CANCER (HCC): Primary | ICD-10-CM

## 2021-11-16 DIAGNOSIS — C73 THYROID CANCER (HCC): ICD-10-CM

## 2021-11-16 DIAGNOSIS — E89.0 POSTOPERATIVE HYPOTHYROIDISM: ICD-10-CM

## 2021-11-16 LAB — TSH SERPL DL<=0.05 MIU/L-ACNC: 1.93 UIU/ML (ref 0.4–4.2)

## 2021-11-16 PROCEDURE — 84443 ASSAY THYROID STIM HORMONE: CPT

## 2021-11-16 PROCEDURE — 1036F TOBACCO NON-USER: CPT | Performed by: INTERNAL MEDICINE

## 2021-11-16 PROCEDURE — G8417 CALC BMI ABV UP PARAM F/U: HCPCS | Performed by: INTERNAL MEDICINE

## 2021-11-16 PROCEDURE — 99211 OFF/OP EST MAY X REQ PHY/QHP: CPT

## 2021-11-16 PROCEDURE — 84436 ASSAY OF TOTAL THYROXINE: CPT

## 2021-11-16 PROCEDURE — G8484 FLU IMMUNIZE NO ADMIN: HCPCS | Performed by: INTERNAL MEDICINE

## 2021-11-16 PROCEDURE — 36415 COLL VENOUS BLD VENIPUNCTURE: CPT

## 2021-11-16 PROCEDURE — 84432 ASSAY OF THYROGLOBULIN: CPT

## 2021-11-16 PROCEDURE — G8427 DOCREV CUR MEDS BY ELIG CLIN: HCPCS | Performed by: INTERNAL MEDICINE

## 2021-11-16 PROCEDURE — 99213 OFFICE O/P EST LOW 20 MIN: CPT | Performed by: INTERNAL MEDICINE

## 2021-11-16 RX ORDER — VITAMIN B COMPLEX
1 CAPSULE ORAL DAILY
COMMUNITY

## 2021-11-16 RX ORDER — LEVOTHYROXINE SODIUM 137 UG/1
TABLET ORAL
Qty: 90 TABLET | Refills: 3 | Status: SHIPPED | OUTPATIENT
Start: 2021-11-16

## 2021-11-19 LAB — THYROXINE (T4): 10.5 UG/DL (ref 4.5–10.9)

## 2021-11-24 NOTE — PROGRESS NOTES
[facility]  [facility]ER NETWORK OF [facility]  [facility] [address] [facility] [address]  [address] [facility] [address]  [address]  Dept: [phone]  Dept Fax: [phone]  Loc: [phone]    Subjective:      Chief Complaint: Bill Barnes is a 54 y.o. female. The patient noticed a thyroid nodule and sought medical care. She first noticed the nodule in August 2016. The nodule was located in the left thyroid. The patient had no other associated sings and symptoms related to the thyroid nodule. In the context of this condition, she was also noted to have normal thyroid function. The patient had an ultrasound which confirmed the left nodule. She underwent a aspiration and was found to have a follicular thyroid. An ultrasound was completed on March 1, 2017 which found a large thyroid adenoma in the left lower thyroid which had slightly increased in size as compared to August 2016. There were 2 smaller nodules noted in the right thyroid which were well-defined and likely represented colloid nodules. The patient was seen by Dr. Navjot Finn and on April 3, 2017 she underwent a total thyroidectomy. Surgical pathology confirmed papillary thyroid carcinoma involving the left thyroid. The maximum tumor size was 5 mm. Margins were free of any neoplasia and she was also noted to have a multinodular goiter. One lymph node was negative for malignancy    HPI: The patient is here today for follow examination. She is here for follow up of her history of thyroid cancer. The patient is currently on therapy with Synthroid. She tolerates this well and without side effects. Her overall health has been good. She has no signs or symptoms that are suggestive of recurrence of her malignancy. The patient denies skeletal pain. She has not had fever or other signs of infection.  The patient denies shortness of breath, chest pain, a change in bowel habits or a change in bladder habits. ECOG performance status is level 0. Laboratory studies from today were reviewed with the patient. The patient's thyroglobulin level is still pending but has been in within normal limits. Her laboratory studies will continue to be monitored. PMH, SH, and FH:  I reviewed the patients medication list and allergy list as noted on the electronic medical record. The PMH, SH and FH were also reviewed as noted on the EMR. Review of Systems  Constitutional: Negative. HENT: Negative. Eyes: Negative. Respiratory: Negative. Cardiovascular: Negative. Gastrointestinal: Negative. Genitourinary: Negative. Musculoskeletal: Negative. Skin: Negative. Neurological: Negative. Hematological: Negative. Psychiatric/Behavioral: Negative. Objective:   Physical Exam  Vitals:    11/16/21 1533   BP: (!) 150/86   Pulse: 87   Resp: 16   Temp: 98.8 °F (37.1 °C)   SpO2: 96%   Vitals reviewed and are stable. Constitutional: Well-developed. No acute distress. HENT: Normocephalic and atraumatic. Eyes: Pupils appear equal and reactive. Neck: Overall appearance is symmetrical. No identifiable masses. Pulmonary: Effort normal. No respiratory distress. .  Neurological: Alert and oriented to person, place, and time. Judgment and thought content normal.  Skin: Skin is warm and dry. No rash. Psychiatric: Mood and affect appropriate for the clinical situation. Assessment:   1. Papillary thyroid carcinoma - stage I. Recommendations:   1. Monitor for recurrence of malignancy. 2.  Monitor thyroid function laboratory studies. 3.  Monitor thyroglobulin. 4.  Continue Synthroid. Bridgette Montalvo M.D.                                                                          Medical Director: [facility]  Cancer [place] [address] [address] [facility], [address] [place] [address] [address], [address] [facility], [address] [place] [address], [address] [place] [address] Av of the [facility] Dylan PAULINO at the [facility]      **This report has been created using voice recognition software. It may contain minor errors which are inherent in voice recognition technology. **

## 2021-11-25 LAB — THYROGLOBULIN: 2 NG/ML (ref 1.3–31.8)

## 2022-11-15 ENCOUNTER — OFFICE VISIT (OUTPATIENT)
Dept: ONCOLOGY | Age: 52
End: 2022-11-15
Payer: COMMERCIAL

## 2022-11-15 ENCOUNTER — HOSPITAL ENCOUNTER (OUTPATIENT)
Dept: INFUSION THERAPY | Age: 52
Discharge: HOME | End: 2022-11-15
Payer: COMMERCIAL

## 2022-11-15 VITALS
TEMPERATURE: 98.7 F | SYSTOLIC BLOOD PRESSURE: 166 MMHG | BODY MASS INDEX: 42.28 KG/M2 | DIASTOLIC BLOOD PRESSURE: 93 MMHG | HEART RATE: 101 BPM | HEIGHT: 65 IN | OXYGEN SATURATION: 97 % | RESPIRATION RATE: 20 BRPM | WEIGHT: 253.8 LBS

## 2022-11-15 VITALS
SYSTOLIC BLOOD PRESSURE: 166 MMHG | DIASTOLIC BLOOD PRESSURE: 93 MMHG | TEMPERATURE: 98.7 F | HEART RATE: 101 BPM | OXYGEN SATURATION: 97 % | HEIGHT: 65 IN | BODY MASS INDEX: 42.28 KG/M2 | RESPIRATION RATE: 20 BRPM | WEIGHT: 253.8 LBS

## 2022-11-15 DIAGNOSIS — C73 THYROID CANCER (HCC): ICD-10-CM

## 2022-11-15 DIAGNOSIS — C73 THYROID CANCER (HCC): Primary | ICD-10-CM

## 2022-11-15 LAB
ABSOLUTE IMMATURE GRANULOCYTE: 0.03 THOU/MM3 (ref 0–0.07)
ALBUMIN SERPL-MCNC: 4.6 G/DL (ref 3.5–5.1)
ALP BLD-CCNC: 89 U/L (ref 38–126)
ALT SERPL-CCNC: 22 U/L (ref 11–66)
ANION GAP SERPL CALCULATED.3IONS-SCNC: 14 MEQ/L (ref 8–16)
AST SERPL-CCNC: 18 U/L (ref 5–40)
BASINOPHIL, AUTOMATED: 1 % (ref 0–3)
BASOPHILS ABSOLUTE: 0.1 THOU/MM3 (ref 0–0.1)
BILIRUB SERPL-MCNC: 0.3 MG/DL (ref 0.3–1.2)
BILIRUBIN DIRECT: < 0.2 MG/DL (ref 0–0.3)
BUN BLDV-MCNC: 13 MG/DL (ref 7–22)
CALCIUM SERPL-MCNC: 9.3 MG/DL (ref 8.5–10.5)
CHLORIDE BLD-SCNC: 102 MEQ/L (ref 98–111)
CO2: 24 MEQ/L (ref 23–33)
CREAT SERPL-MCNC: 0.7 MG/DL (ref 0.4–1.2)
EOSINOPHILS ABSOLUTE: 0.2 THOU/MM3 (ref 0–0.4)
EOSINOPHILS RELATIVE PERCENT: 2 % (ref 0–4)
GFR SERPL CREATININE-BSD FRML MDRD: > 60 ML/MIN/1.73M2
GLUCOSE BLD-MCNC: 103 MG/DL (ref 70–108)
HCT VFR BLD CALC: 36.9 % (ref 37–47)
HEMOGLOBIN: 11.8 GM/DL (ref 12–16)
IMMATURE GRANULOCYTES: 0 %
LYMPHOCYTES # BLD: 25 % (ref 15–47)
LYMPHOCYTES ABSOLUTE: 2.5 THOU/MM3 (ref 1–4.8)
MCH RBC QN AUTO: 26.1 PG (ref 26–33)
MCHC RBC AUTO-ENTMCNC: 32 GM/DL (ref 32.2–35.5)
MCV RBC AUTO: 82 FL (ref 81–99)
MONOCYTES ABSOLUTE: 0.7 THOU/MM3 (ref 0.4–1.3)
MONOCYTES: 7 % (ref 0–12)
PDW BLD-RTO: 15.2 % (ref 11.5–14.5)
PLATELET # BLD: 393 THOU/MM3 (ref 130–400)
PMV BLD AUTO: 10.3 FL (ref 9.4–12.4)
POTASSIUM SERPL-SCNC: 3.8 MEQ/L (ref 3.5–5.2)
RBC # BLD: 4.52 MILL/MM3 (ref 4.2–5.4)
SEG NEUTROPHILS: 65 % (ref 43–75)
SEGMENTED NEUTROPHILS ABSOLUTE COUNT: 6.5 THOU/MM3 (ref 1.8–7.7)
SODIUM BLD-SCNC: 140 MEQ/L (ref 135–145)
TOTAL PROTEIN: 7.1 G/DL (ref 6.1–8)
TSH SERPL DL<=0.05 MIU/L-ACNC: 3.02 UIU/ML (ref 0.4–4.2)
WBC # BLD: 9.9 THOU/MM3 (ref 4.8–10.8)

## 2022-11-15 PROCEDURE — 99211 OFF/OP EST MAY X REQ PHY/QHP: CPT

## 2022-11-15 PROCEDURE — 82248 BILIRUBIN DIRECT: CPT

## 2022-11-15 PROCEDURE — 84443 ASSAY THYROID STIM HORMONE: CPT

## 2022-11-15 PROCEDURE — 3074F SYST BP LT 130 MM HG: CPT | Performed by: INTERNAL MEDICINE

## 2022-11-15 PROCEDURE — 99214 OFFICE O/P EST MOD 30 MIN: CPT | Performed by: INTERNAL MEDICINE

## 2022-11-15 PROCEDURE — 85025 COMPLETE CBC W/AUTO DIFF WBC: CPT

## 2022-11-15 PROCEDURE — 3078F DIAST BP <80 MM HG: CPT | Performed by: INTERNAL MEDICINE

## 2022-11-15 PROCEDURE — 80053 COMPREHEN METABOLIC PANEL: CPT

## 2022-11-15 PROCEDURE — 84432 ASSAY OF THYROGLOBULIN: CPT

## 2022-11-15 NOTE — PROGRESS NOTES
[facility]  CANCER NETWORK OF [facility]  [facility] [place] [address] [facility]  [address] [place] [address]  Dept: [phone]  Dept Fax: [phone]: [phone]     Encounter Date: 11/15/2022    Referring Physician:  No ref. provider found     Primary Provider: Ilya Grimaldo MD     HPI:  Ray Parker is a 54 y.o. female. The patient noticed a thyroid nodule and sought medical care. She first noticed the nodule in August 2016. The nodule was located in the left thyroid. The patient had no other associated sings and symptoms related to the thyroid nodule. In the context of this condition, she was also noted to have normal thyroid function. The patient had an ultrasound which confirmed the left nodule. She underwent a aspiration and was found to have a follicular thyroid. An ultrasound was completed on March 1, 2017 which found a large thyroid adenoma in the left lower thyroid which had slightly increased in size as compared to August 2016. There were 2 smaller nodules noted in the right thyroid which were well-defined and likely represented colloid nodules. The patient was seen by Dr. Sera Waddell and on April 3, 2017 she underwent a total thyroidectomy. Surgical pathology confirmed papillary thyroid carcinoma involving the left thyroid. The maximum tumor size was 5 mm. Margins were free of any neoplasia and she was also noted to have a multinodular goiter. One lymph node was negative for malignancy     The patient is here today for follow examination. She is here for follow up of her history of thyroid cancer. The patient is currently on therapy with Synthroid. She tolerates this well and without side effects. Her overall health has been good. She has no signs or symptoms that are suggestive of recurrence of her malignancy. The patient denies skeletal pain.   She has not had fever or other signs of rales,rhonchi or wheezing, lung bases clear  CV: rrr, no rubs ,gallops or murmurs  Abdomen: soft, non-tender,bowel sounds normal , no palpable hepatosplenomegaly  Back: normal curvature, No midline tenderness. flanks nontender  : Not Examined  Extremities: no cyanosis,clubbing or edema. Skin: unremarkable  Neuro: A and O x 4, CN exam nonfocal, Motor- no deficits, Sensory- no deficits, gait-nl, speech- fluent, no ataxia. Assessment/Plan:    Papillary thyroid carcinoma - stage I.     1.  Monitor for recurrence of malignancy. 2.  Monitor thyroid function laboratory studies. 3.  Monitor thyroglobulin. 4.  Continue Synthroid. Patient Instructions   RTC  6 m with labs     Lucio Shoemaker MD  11/15/2022      **This report has been created using voice recognition software. It may contain minor errors which are inherent in voice recognition technology. **

## 2022-11-22 LAB — THYROGLOBULIN: 1.8 NG/ML (ref 1.3–31.8)

## 2023-01-17 DIAGNOSIS — E89.0 POSTOPERATIVE HYPOTHYROIDISM: ICD-10-CM

## 2023-01-17 DIAGNOSIS — C73 THYROID CANCER (HCC): ICD-10-CM

## 2023-01-18 RX ORDER — LEVOTHYROXINE SODIUM 137 UG/1
TABLET ORAL
Qty: 90 TABLET | Refills: 3 | Status: SHIPPED | OUTPATIENT
Start: 2023-01-18

## 2023-05-08 ENCOUNTER — HOSPITAL ENCOUNTER (OUTPATIENT)
Age: 53
Discharge: HOME | End: 2023-05-08
Payer: COMMERCIAL

## 2023-05-08 DIAGNOSIS — C73 THYROID CANCER (HCC): ICD-10-CM

## 2023-05-08 LAB
BASOPHILS ABSOLUTE: 0.1 THOU/MM3 (ref 0–0.1)
BASOPHILS NFR BLD AUTO: 0.7 %
DEPRECATED RDW RBC AUTO: 48.1 FL (ref 35–45)
EOSINOPHIL NFR BLD AUTO: 1.8 %
EOSINOPHILS ABSOLUTE: 0.2 THOU/MM3 (ref 0–0.4)
ERYTHROCYTE [DISTWIDTH] IN BLOOD BY AUTOMATED COUNT: 15 % (ref 11.5–14.5)
HCT VFR BLD AUTO: 37.1 % (ref 37–47)
HGB BLD-MCNC: 11.1 GM/DL (ref 12–16)
IMM GRANULOCYTES # BLD AUTO: 0.04 THOU/MM3 (ref 0–0.07)
IMM GRANULOCYTES NFR BLD AUTO: 0.5 %
LYMPHOCYTES ABSOLUTE: 3 THOU/MM3 (ref 1–4.8)
LYMPHOCYTES NFR BLD AUTO: 33.3 %
MCH RBC QN AUTO: 26.4 PG (ref 26–33)
MCHC RBC AUTO-ENTMCNC: 29.9 GM/DL (ref 32.2–35.5)
MCV RBC AUTO: 88.3 FL (ref 81–99)
MONOCYTES ABSOLUTE: 0.7 THOU/MM3 (ref 0.4–1.3)
MONOCYTES NFR BLD AUTO: 7.7 %
NEUTROPHILS NFR BLD AUTO: 56 %
NRBC BLD AUTO-RTO: 0 /100 WBC
PLATELET # BLD AUTO: 389 THOU/MM3 (ref 130–400)
PMV BLD AUTO: 11.5 FL (ref 9.4–12.4)
RBC # BLD AUTO: 4.2 MILL/MM3 (ref 4.2–5.4)
SEGMENTED NEUTROPHILS ABSOLUTE COUNT: 5 THOU/MM3 (ref 1.8–7.7)
WBC # BLD AUTO: 8.9 THOU/MM3 (ref 4.8–10.8)

## 2023-05-08 PROCEDURE — 84443 ASSAY THYROID STIM HORMONE: CPT

## 2023-05-08 PROCEDURE — 84432 ASSAY OF THYROGLOBULIN: CPT

## 2023-05-08 PROCEDURE — 85025 COMPLETE CBC W/AUTO DIFF WBC: CPT

## 2023-05-08 PROCEDURE — 80053 COMPREHEN METABOLIC PANEL: CPT

## 2023-05-09 LAB
ALBUMIN SERPL BCG-MCNC: 4.3 G/DL (ref 3.5–5.1)
ALP SERPL-CCNC: 78 U/L (ref 38–126)
ALT SERPL W/O P-5'-P-CCNC: 26 U/L (ref 11–66)
ANION GAP SERPL CALC-SCNC: 13 MEQ/L (ref 8–16)
AST SERPL-CCNC: 22 U/L (ref 5–40)
BILIRUB SERPL-MCNC: 0.2 MG/DL (ref 0.3–1.2)
BUN SERPL-MCNC: 14 MG/DL (ref 7–22)
CALCIUM SERPL-MCNC: 9.2 MG/DL (ref 8.5–10.5)
CHLORIDE SERPL-SCNC: 101 MEQ/L (ref 98–111)
CO2 SERPL-SCNC: 27 MEQ/L (ref 23–33)
CREAT SERPL-MCNC: 0.6 MG/DL (ref 0.4–1.2)
GFR SERPL CREATININE-BSD FRML MDRD: > 60 ML/MIN/1.73M2
GLUCOSE SERPL-MCNC: 93 MG/DL (ref 70–108)
POTASSIUM SERPL-SCNC: 3.5 MEQ/L (ref 3.5–5.2)
PROT SERPL-MCNC: 7 G/DL (ref 6.1–8)
SODIUM SERPL-SCNC: 141 MEQ/L (ref 135–145)
TSH SERPL DL<=0.005 MIU/L-ACNC: 3.77 UIU/ML (ref 0.4–4.2)

## 2023-05-13 LAB — THYROGLOB SERPL-MCNC: 2.2 NG/ML (ref 1.3–31.8)

## 2023-05-16 ENCOUNTER — HOSPITAL ENCOUNTER (OUTPATIENT)
Dept: INFUSION THERAPY | Age: 53
Discharge: HOME | End: 2023-05-16
Payer: COMMERCIAL

## 2023-05-16 ENCOUNTER — OFFICE VISIT (OUTPATIENT)
Dept: ONCOLOGY | Age: 53
End: 2023-05-16
Payer: COMMERCIAL

## 2023-05-16 VITALS
SYSTOLIC BLOOD PRESSURE: 159 MMHG | BODY MASS INDEX: 43.12 KG/M2 | HEIGHT: 65 IN | RESPIRATION RATE: 20 BRPM | TEMPERATURE: 97.1 F | HEART RATE: 88 BPM | WEIGHT: 258.8 LBS | DIASTOLIC BLOOD PRESSURE: 98 MMHG | OXYGEN SATURATION: 98 %

## 2023-05-16 VITALS
HEART RATE: 88 BPM | RESPIRATION RATE: 20 BRPM | WEIGHT: 258.8 LBS | BODY MASS INDEX: 43.12 KG/M2 | SYSTOLIC BLOOD PRESSURE: 159 MMHG | DIASTOLIC BLOOD PRESSURE: 98 MMHG | OXYGEN SATURATION: 98 % | TEMPERATURE: 97.1 F | HEIGHT: 65 IN

## 2023-05-16 DIAGNOSIS — C73 THYROID CANCER (HCC): Primary | ICD-10-CM

## 2023-05-16 DIAGNOSIS — C73 THYROID CANCER (HCC): ICD-10-CM

## 2023-05-16 LAB — TSH SERPL DL<=0.005 MIU/L-ACNC: 3.78 UIU/ML (ref 0.4–4.2)

## 2023-05-16 PROCEDURE — 84443 ASSAY THYROID STIM HORMONE: CPT

## 2023-05-16 PROCEDURE — 3080F DIAST BP >= 90 MM HG: CPT | Performed by: INTERNAL MEDICINE

## 2023-05-16 PROCEDURE — 99211 OFF/OP EST MAY X REQ PHY/QHP: CPT

## 2023-05-16 PROCEDURE — 82728 ASSAY OF FERRITIN: CPT

## 2023-05-16 PROCEDURE — 99214 OFFICE O/P EST MOD 30 MIN: CPT | Performed by: INTERNAL MEDICINE

## 2023-05-16 PROCEDURE — 84432 ASSAY OF THYROGLOBULIN: CPT

## 2023-05-16 PROCEDURE — 83540 ASSAY OF IRON: CPT

## 2023-05-16 PROCEDURE — 3077F SYST BP >= 140 MM HG: CPT | Performed by: INTERNAL MEDICINE

## 2023-05-16 PROCEDURE — 83550 IRON BINDING TEST: CPT

## 2023-05-16 RX ORDER — PHENOL 1.4 %
AEROSOL, SPRAY (ML) MUCOUS MEMBRANE
COMMUNITY
Start: 2022-09-15

## 2023-05-16 ASSESSMENT — ENCOUNTER SYMPTOMS
COLOR CHANGE: 0
CHEST TIGHTNESS: 0
WHEEZING: 0
DIARRHEA: 0
ANAL BLEEDING: 0
COUGH: 0
EYE PAIN: 0
BLOOD IN STOOL: 0
FACIAL SWELLING: 0
VOMITING: 0
CONSTIPATION: 0
STRIDOR: 0
ABDOMINAL PAIN: 0
TROUBLE SWALLOWING: 0
CHOKING: 0
RECTAL PAIN: 0
ABDOMINAL DISTENTION: 0
APNEA: 0
BACK PAIN: 0
SHORTNESS OF BREATH: 0
NAUSEA: 0
EYE DISCHARGE: 0
SINUS PAIN: 0

## 2023-05-16 NOTE — PATIENT INSTRUCTIONS
ASSESSMENT/PLAN:    1: Diagnosis: Papillary thyroid carcinoma, left thyroid, stage I      2) Treatment goal:      Treatment plan:  1. Monitor for recurrence of malignancy.-Schedule Neck ultrasound, with inclusion of thyroid and lymph node regions, this month, then yearly  2. Monitor thyroid function laboratory studies. 3.  Monitor thyroglobulin. 4.  Continue Synthroid. 5.  Ferritin, iron, TIBC, % saturation today  6.   Schedule screening colonoscopy with primary care physician in view of mild anemia      3) Follow Up:  Office appointment 6 months TSH and thyroglobulin

## 2023-05-16 NOTE — PROGRESS NOTES
[address] [address] [address]  Dept: [phone]  Loc: [phone]   Hematology/Oncology Progress Note (Clinic)        Amy Fulling  1970 5/16/2023     No ref. provider found   Juan Jose Vogt MD     Diagnosis:   Papillary thyroid cancer, left thyroid, stage I      Treatment:   Total thyroidectomy, April 3, 2017 (Dr. Claudette Sheriff)      Followable Disease:   TSH  Thyroglobulin  Ultrasound of the neck      Comorbidities:  Hypertension      Subjective:    Patient returns in follow-up of thyroid cancer. She is completely asymptomatic except for persistent fatigue. She has been checking her lab results on MyChart and found that her TSH is slowly increasing, but still within normal range. We discussed the fact that we would consider increasing her thyroid replacement if the TSH increases above normal range. Patient also noted that she has a mild anemia. She has not had menstrual periods for approximately 10 years. She attempted to schedule a colonoscopy last year, but it was used by her insurance company since she was not yet 50. HPI:  This patient who was referred by Dr Claudette Sheriff for evaluation of thyroid cancer. [facility] noticed a thyroid nodule and sought medical care. She first noticed the nodule in August 2016. The nodule was located in the left thyroid. The patient had no other associated sings and symptoms related to the thyroid nodule. In the context of this condition, she was also noted to have normal thyroid function. The patient had an ultrasound which confirmed the left nodule. She underwent a aspiration and was found to have a follicular thyroid. An ultrasound was completed on March 1, 2017 which found a large thyroid adenoma in the left lower thyroid which had slightly increased in size as compared to August 2016.   There were 2 smaller nodules noted in the right thyroid which were

## 2023-05-17 LAB
FERRITIN SERPL IA-MCNC: 46 NG/ML (ref 10–291)
IRON SATN MFR SERPL: 12 % (ref 20–50)
IRON SERPL-MCNC: 45 UG/DL (ref 50–170)
TIBC SERPL-MCNC: 387 UG/DL (ref 171–450)

## 2023-05-22 LAB — THYROGLOB SERPL-MCNC: 1.7 NG/ML (ref 1.3–31.8)

## 2023-11-14 DIAGNOSIS — D50.9 IRON DEFICIENCY ANEMIA, UNSPECIFIED IRON DEFICIENCY ANEMIA TYPE: ICD-10-CM

## 2023-11-14 DIAGNOSIS — C73 THYROID CANCER (HCC): Primary | ICD-10-CM

## 2023-11-14 NOTE — PROGRESS NOTES
Prostate Cancer Father     Thyroid Disease Father     High Blood Pressure Brother         Social HX:   Social History     Socioeconomic History    Marital status:      Spouse name: Not on file    Number of children: Not on file    Years of education: Not on file    Highest education level: Not on file   Occupational History    Not on file   Tobacco Use    Smoking status: Never    Smokeless tobacco: Never   Substance and Sexual Activity    Alcohol use: Yes    Drug use: No    Sexual activity: Not on file   Other Topics Concern    Not on file   Social History Narrative    Not on file     Social Determinants of Health     Financial Resource Strain: Not on file   Food Insecurity: Not on file   Transportation Needs: Not on file   Physical Activity: Not on file   Stress: Not on file   Social Connections: Not on file   Intimate Partner Violence: Not on file   Housing Stability: Not on file          EXAM:   height is 1.651 m (5' 5\") and weight is 119.5 kg (263 lb 6.4 oz). Her blood pressure is 153/86 (abnormal) and her pulse is 76. Her respiration is 20 and oxygen saturation is 96%. Estimated body surface area is 2.34 meters squared as calculated from the following:    Height as of this encounter: 1.651 m (5' 5\"). Weight as of this encounter: 119.5 kg (263 lb 6.4 oz). ECO  GENERAL: well developed female, no acute distress   HEAD/FACE: atraumatic and normocephalic. EYES: No scleral icterus. NECK: Supple, symmetric. CHEST/RESPIRATORY: clear breath sounds in both lungs. CARDIOVASCULAR: On auscultation, regular rate and rhythm. No murmurs, gallops or rubs are heard. GASTROINTESTINAL/ABDOMEN: soft, non tender  NEUROLOGIC: Alert and oriented, no focal deficits   MUSCULOSKELETAL: no cyanosis, clubbing or edema in the extremities.    LYMPHATICS: no lymphadenopathy in cervical, axillary areas       ASSESSMENT:     History of papillary thyroid carcinoma-  - s/p total thyroidectomy on 4/3/2017-papillary

## 2023-11-15 ENCOUNTER — HOSPITAL ENCOUNTER (OUTPATIENT)
Dept: INFUSION THERAPY | Age: 53
Discharge: HOME | End: 2023-11-15
Payer: COMMERCIAL

## 2023-11-15 ENCOUNTER — OFFICE VISIT (OUTPATIENT)
Dept: ONCOLOGY | Age: 53
End: 2023-11-15
Payer: COMMERCIAL

## 2023-11-15 VITALS
SYSTOLIC BLOOD PRESSURE: 153 MMHG | DIASTOLIC BLOOD PRESSURE: 86 MMHG | WEIGHT: 263.4 LBS | HEART RATE: 76 BPM | BODY MASS INDEX: 43.89 KG/M2 | OXYGEN SATURATION: 96 % | RESPIRATION RATE: 20 BRPM | HEIGHT: 65 IN

## 2023-11-15 VITALS
HEART RATE: 76 BPM | RESPIRATION RATE: 20 BRPM | SYSTOLIC BLOOD PRESSURE: 153 MMHG | BODY MASS INDEX: 43.89 KG/M2 | OXYGEN SATURATION: 96 % | WEIGHT: 263.4 LBS | HEIGHT: 65 IN | DIASTOLIC BLOOD PRESSURE: 86 MMHG

## 2023-11-15 DIAGNOSIS — C73 THYROID CANCER (HCC): Primary | ICD-10-CM

## 2023-11-15 DIAGNOSIS — C73 THYROID CANCER (HCC): ICD-10-CM

## 2023-11-15 DIAGNOSIS — D50.9 IRON DEFICIENCY ANEMIA, UNSPECIFIED IRON DEFICIENCY ANEMIA TYPE: ICD-10-CM

## 2023-11-15 LAB
ALBUMIN SERPL BCG-MCNC: 4.4 G/DL (ref 3.5–5.1)
ALP SERPL-CCNC: 83 U/L (ref 38–126)
ALT SERPL W/O P-5'-P-CCNC: 48 U/L (ref 11–66)
AST SERPL-CCNC: 41 U/L (ref 5–40)
BASOPHILS # BLD AUTO: 0.1 THOU/MM3 (ref 0–0.1)
BASOPHILS NFR BLD AUTO: 1 % (ref 0–3)
BILIRUB CONJ SERPL-MCNC: < 0.2 MG/DL (ref 0–0.3)
BILIRUB SERPL-MCNC: 0.3 MG/DL (ref 0.3–1.2)
BUN BLDP-MCNC: 14 MG/DL (ref 8–26)
CHLORIDE BLD-SCNC: 103 MEQ/L (ref 98–109)
CREAT BLD-MCNC: 0.7 MG/DL (ref 0.5–1.2)
EOSINOPHIL # BLD AUTO: 0.3 THOU/MM3 (ref 0–0.4)
EOSINOPHIL NFR BLD AUTO: 4 % (ref 0–4)
ERYTHROCYTE [DISTWIDTH] IN BLOOD BY AUTOMATED COUNT: 14.6 % (ref 11.5–14.5)
FERRITIN SERPL IA-MCNC: 108 NG/ML (ref 10–291)
GFR SERPL CREATININE-BSD FRML MDRD: > 60 ML/MIN/1.73M2
GLUCOSE BLD-MCNC: 105 MG/DL (ref 70–108)
HCT VFR BLD AUTO: 38.4 % (ref 37–47)
HGB BLD-MCNC: 12.1 GM/DL (ref 12–16)
IMM GRANULOCYTES # BLD AUTO: 0.05 THOU/MM3 (ref 0–0.07)
IMM GRANULOCYTES NFR BLD AUTO: 1 %
IONIZED CALCIUM, WHOLE BLOOD: 1.2 MMOL/L (ref 1.12–1.32)
IRON SATN MFR SERPL: 25 % (ref 20–50)
IRON SERPL-MCNC: 90 UG/DL (ref 50–170)
LYMPHOCYTES # BLD AUTO: 2.2 THOU/MM3 (ref 1–4.8)
LYMPHOCYTES NFR BLD AUTO: 28 % (ref 15–47)
MCH RBC QN AUTO: 27.4 PG (ref 26–33)
MCHC RBC AUTO-ENTMCNC: 31.5 GM/DL (ref 32.2–35.5)
MCV RBC AUTO: 87 FL (ref 81–99)
MONOCYTES # BLD AUTO: 0.5 THOU/MM3 (ref 0.4–1.3)
MONOCYTES NFR BLD AUTO: 6 % (ref 0–12)
NEUTROPHILS NFR BLD AUTO: 60 % (ref 43–75)
PLATELET # BLD AUTO: 365 THOU/MM3 (ref 130–400)
PMV BLD AUTO: 10.5 FL (ref 9.4–12.4)
POTASSIUM BLD-SCNC: 4.2 MEQ/L (ref 3.5–4.9)
PROT SERPL-MCNC: 7.2 G/DL (ref 6.1–8)
RBC # BLD AUTO: 4.41 MILL/MM3 (ref 4.2–5.4)
SEGMENTED NEUTROPHILS ABSOLUTE COUNT: 4.8 THOU/MM3 (ref 1.8–7.7)
SODIUM BLD-SCNC: 142 MEQ/L (ref 138–146)
T4 FREE SERPL-MCNC: 1.38 NG/DL (ref 0.93–1.76)
TIBC SERPL-MCNC: 354 UG/DL (ref 171–450)
TOTAL CO2, WHOLE BLOOD: 27 MEQ/L (ref 23–33)
TSH SERPL DL<=0.005 MIU/L-ACNC: 6.47 UIU/ML (ref 0.4–4.2)
WBC # BLD AUTO: 7.9 THOU/MM3 (ref 4.8–10.8)

## 2023-11-15 PROCEDURE — 84443 ASSAY THYROID STIM HORMONE: CPT

## 2023-11-15 PROCEDURE — 85025 COMPLETE CBC W/AUTO DIFF WBC: CPT

## 2023-11-15 PROCEDURE — 83540 ASSAY OF IRON: CPT

## 2023-11-15 PROCEDURE — 99211 OFF/OP EST MAY X REQ PHY/QHP: CPT

## 2023-11-15 PROCEDURE — 3079F DIAST BP 80-89 MM HG: CPT | Performed by: INTERNAL MEDICINE

## 2023-11-15 PROCEDURE — 80076 HEPATIC FUNCTION PANEL: CPT

## 2023-11-15 PROCEDURE — 84439 ASSAY OF FREE THYROXINE: CPT

## 2023-11-15 PROCEDURE — 86800 THYROGLOBULIN ANTIBODY: CPT

## 2023-11-15 PROCEDURE — 99214 OFFICE O/P EST MOD 30 MIN: CPT | Performed by: INTERNAL MEDICINE

## 2023-11-15 PROCEDURE — 80047 BASIC METABLC PNL IONIZED CA: CPT

## 2023-11-15 PROCEDURE — 83550 IRON BINDING TEST: CPT

## 2023-11-15 PROCEDURE — 82728 ASSAY OF FERRITIN: CPT

## 2023-11-15 PROCEDURE — 3077F SYST BP >= 140 MM HG: CPT | Performed by: INTERNAL MEDICINE

## 2023-11-16 DIAGNOSIS — E89.0 POSTOPERATIVE HYPOTHYROIDISM: Primary | ICD-10-CM

## 2023-11-16 RX ORDER — LEVOTHYROXINE SODIUM 0.15 MG/1
150 TABLET ORAL DAILY
Qty: 30 TABLET | Refills: 3 | Status: SHIPPED | OUTPATIENT
Start: 2023-11-16

## 2023-11-16 NOTE — PROGRESS NOTES
TSH has slightly increased to 6.47, will increase the dose of synthroid to 150 mcg daily. Recheck TSH/ T4 in 2 months. Discussed with the patient on the phone.        Jatin Nguyễn MD  Hem Onc

## 2023-11-17 LAB — THYROGLOBULIN: NORMAL

## 2024-05-08 ENCOUNTER — HOSPITAL ENCOUNTER (OUTPATIENT)
Age: 54
Discharge: HOME | End: 2024-05-08
Payer: COMMERCIAL

## 2024-05-08 DIAGNOSIS — C73 THYROID CANCER (HCC): ICD-10-CM

## 2024-05-08 LAB
ALBUMIN SERPL BCG-MCNC: 4.5 G/DL (ref 3.5–5.1)
ALP SERPL-CCNC: 89 U/L (ref 38–126)
ALT SERPL W/O P-5'-P-CCNC: 66 U/L (ref 11–66)
ANION GAP SERPL CALC-SCNC: 15 MEQ/L (ref 8–16)
AST SERPL-CCNC: 54 U/L (ref 5–40)
BASOPHILS ABSOLUTE: 0.1 THOU/MM3 (ref 0–0.1)
BASOPHILS NFR BLD AUTO: 1 %
BILIRUB SERPL-MCNC: 0.3 MG/DL (ref 0.3–1.2)
BUN SERPL-MCNC: 13 MG/DL (ref 7–22)
CALCIUM SERPL-MCNC: 10 MG/DL (ref 8.5–10.5)
CHLORIDE SERPL-SCNC: 100 MEQ/L (ref 98–111)
CO2 SERPL-SCNC: 27 MEQ/L (ref 23–33)
CREAT SERPL-MCNC: 0.7 MG/DL (ref 0.4–1.2)
DEPRECATED RDW RBC AUTO: 47.4 FL (ref 35–45)
EOSINOPHIL NFR BLD AUTO: 2.8 %
EOSINOPHILS ABSOLUTE: 0.3 THOU/MM3 (ref 0–0.4)
ERYTHROCYTE [DISTWIDTH] IN BLOOD BY AUTOMATED COUNT: 14.1 % (ref 11.5–14.5)
GFR SERPL CREATININE-BSD FRML MDRD: > 90 ML/MIN/1.73M2
GLUCOSE SERPL-MCNC: 123 MG/DL (ref 70–108)
HCT VFR BLD AUTO: 42.9 % (ref 37–47)
HGB BLD-MCNC: 13.5 GM/DL (ref 12–16)
IMM GRANULOCYTES # BLD AUTO: 0.05 THOU/MM3 (ref 0–0.07)
IMM GRANULOCYTES NFR BLD AUTO: 0.5 %
LYMPHOCYTES ABSOLUTE: 2.8 THOU/MM3 (ref 1–4.8)
LYMPHOCYTES NFR BLD AUTO: 29.8 %
MCH RBC QN AUTO: 28.9 PG (ref 26–33)
MCHC RBC AUTO-ENTMCNC: 31.5 GM/DL (ref 32.2–35.5)
MCV RBC AUTO: 91.9 FL (ref 81–99)
MONOCYTES ABSOLUTE: 0.6 THOU/MM3 (ref 0.4–1.3)
MONOCYTES NFR BLD AUTO: 6.4 %
NEUTROPHILS ABSOLUTE: 5.5 THOU/MM3 (ref 1.8–7.7)
NEUTROPHILS NFR BLD AUTO: 59.5 %
NRBC BLD AUTO-RTO: 0 /100 WBC
PLATELET # BLD AUTO: 411 THOU/MM3 (ref 130–400)
PMV BLD AUTO: 11.7 FL (ref 9.4–12.4)
POTASSIUM SERPL-SCNC: 4.7 MEQ/L (ref 3.5–5.2)
PROT SERPL-MCNC: 7.8 G/DL (ref 6.1–8)
RBC # BLD AUTO: 4.67 MILL/MM3 (ref 4.2–5.4)
SODIUM SERPL-SCNC: 142 MEQ/L (ref 135–145)
TSH SERPL DL<=0.005 MIU/L-ACNC: 2.94 UIU/ML (ref 0.4–4.2)
WBC # BLD AUTO: 9.3 THOU/MM3 (ref 4.8–10.8)

## 2024-05-08 PROCEDURE — 84443 ASSAY THYROID STIM HORMONE: CPT

## 2024-05-08 PROCEDURE — 85025 COMPLETE CBC W/AUTO DIFF WBC: CPT

## 2024-05-08 PROCEDURE — 80053 COMPREHEN METABOLIC PANEL: CPT

## 2024-05-08 PROCEDURE — 86800 THYROGLOBULIN ANTIBODY: CPT

## 2024-05-09 LAB — THYROGLOBULIN: NORMAL

## 2024-05-15 ENCOUNTER — TELEMEDICINE (OUTPATIENT)
Dept: ONCOLOGY | Age: 54
End: 2024-05-15
Payer: COMMERCIAL

## 2024-05-15 ENCOUNTER — HOSPITAL ENCOUNTER (OUTPATIENT)
Dept: INFUSION THERAPY | Age: 54
Discharge: HOME | End: 2024-05-15
Payer: COMMERCIAL

## 2024-05-15 DIAGNOSIS — C73 THYROID CANCER (HCC): Primary | ICD-10-CM

## 2024-05-15 PROCEDURE — 99211 OFF/OP EST MAY X REQ PHY/QHP: CPT

## 2024-05-15 PROCEDURE — 99443 PR PHYS/QHP TELEPHONE EVALUATION 21-30 MIN: CPT | Performed by: INTERNAL MEDICINE

## 2024-05-15 NOTE — PROGRESS NOTES
[facility] [place] [facility]  [facility]  [address]  [address]  [facility] [address]  Dept: [phone]  Loc: [phone]   Hematology/Oncology Progress Note (Clinic)        Ceci Valentino  1970  No ref. provider found   Geraldo Perez MD     Diagnosis/CC:   No chief complaint on file.      HPI:  This patient who was referred by Dr Moser for evaluation of thyroid cancer.  Ceci noticed a thyroid nodule and sought medical care.  She first noticed the nodule in August 2016.  The nodule was located in the left thyroid.  The patient had no other associated sings and symptoms related to the thyroid nodule. In the context of this condition, she was also noted to have normal thyroid function. The patient had an ultrasound which confirmed the left nodule.  She underwent a aspiration and was found to have a follicular thyroid.  An ultrasound was completed on March 1, 2017 which found a large thyroid adenoma in the left lower thyroid which had slightly increased in size as compared to August 2016.  There were 2 smaller nodules noted in the right thyroid which were well-defined and likely represented colloid nodules.  The patient was seen by Dr. Moser and on April 3, 2017 she underwent a total thyroidectomy.  Surgical pathology confirmed papillary thyroid carcinoma involving the left thyroid.  The maximum tumor size was 5 mm.  Margins were free of any neoplasia and she was also noted to have a multinodular goiter.  One lymph node was negative for malignancy. She has recovered well from her surgery. The patient denies shortness of breath, chest pain, or a change in bowel habits or a change in bladder habits.  ECOG performance status is level 0.       Subjective/Interim Summary:   11/15/23-  Patient has been doing well.  Denies any new symptoms.  No neck pain or swelling.  She has been compliant with Synthroid.  Denies nausea, vomiting, change in bowel habits or

## 2024-05-15 NOTE — PATIENT INSTRUCTIONS
Labs reviewed and fairly normal.   Ultrasound of neck negative for cancer.   Follow up yearly with labs and neck ultrasound 1-2 weeks prior.

## 2025-05-13 DIAGNOSIS — C73 THYROID CANCER (HCC): Primary | ICD-10-CM

## 2025-05-14 ENCOUNTER — HOSPITAL ENCOUNTER (OUTPATIENT)
Dept: INFUSION THERAPY | Age: 55
Discharge: HOME | End: 2025-05-14

## 2025-06-16 NOTE — PROGRESS NOTES
[facility] [place] [facility]  [facility]  [address]  [address]  [facility] [address]  Dept: [phone]  Loc: [phone]       Visit Date:6/17/2025     Ceci Valentino is a 54 y.o. female who presents today for:   Chief Complaint   Patient presents with    Follow-up     Thyroid cancer         HPI:   Ceci Valentino is a 54 y.o. female with thyroid cancer.  She has a history of HTN, uterine fibroids s/p myomectomy (2010), diabetes, hyperlipidemia, iron deficiency anemia and GERD.    03/01/2017 ultrasound of the thyroid showed a large thyroid adenoma in the left lower thyroid, slightly increased in size as compared to August 2016.  2 smaller nodules noted in the right thyroid were well-defined and likely represented colloid nodules.    04/03/2017 the patient underwent a total thyroidectomy.  Surgical pathology confirmed papillary thyroid carcinoma involving the left thyroid, maximum tumor size was 5 mm, margins free of neoplasia.  Noted to have a multinodular goiter.  1 lymph node was negative for malignancy.    04/26/2017 the patient was seen for initial evaluation with Dr. Horvath.  Recommended follow-up with endocrinology for radioactive iodine therapy.    06/30/2017 the patient was seen for initial evaluation with endocrinology, Dr. Natarajan.  The patient was advised she presented with low risk disease and thyroidectomy alone is sufficient based on revised BON guidelines.  Continued on treatment with Synthroid.    05/08/2024 TSH level within normal limits at 2.940    05/15/2024 follow-up evaluation with Dr. Leija.  Recommended continued annual monitoring with ultrasound of the neck for surveillance of multiple benign-appearing lymph nodes.  The patient was noted to be on treatment with Synthroid 175 mcg by mouth daily per her PCP.    05/14/2025 ultrasound of the thyroid showed s/p total thyroidectomy, no obvious mass or fluid collections in the thyroid bed.

## 2025-06-17 ENCOUNTER — HOSPITAL ENCOUNTER (OUTPATIENT)
Dept: INFUSION THERAPY | Age: 55
Discharge: HOME | End: 2025-06-17
Payer: COMMERCIAL

## 2025-06-17 ENCOUNTER — OFFICE VISIT (OUTPATIENT)
Dept: ONCOLOGY | Age: 55
End: 2025-06-17
Payer: COMMERCIAL

## 2025-06-17 VITALS
BODY MASS INDEX: 43.49 KG/M2 | SYSTOLIC BLOOD PRESSURE: 120 MMHG | WEIGHT: 261 LBS | RESPIRATION RATE: 18 BRPM | OXYGEN SATURATION: 96 % | HEART RATE: 79 BPM | HEIGHT: 65 IN | DIASTOLIC BLOOD PRESSURE: 78 MMHG

## 2025-06-17 VITALS
RESPIRATION RATE: 18 BRPM | BODY MASS INDEX: 43.49 KG/M2 | SYSTOLIC BLOOD PRESSURE: 120 MMHG | DIASTOLIC BLOOD PRESSURE: 78 MMHG | HEART RATE: 79 BPM | OXYGEN SATURATION: 96 % | WEIGHT: 261 LBS | HEIGHT: 65 IN

## 2025-06-17 DIAGNOSIS — C73 THYROID CANCER (HCC): Primary | ICD-10-CM

## 2025-06-17 PROCEDURE — 3078F DIAST BP <80 MM HG: CPT | Performed by: NURSE PRACTITIONER

## 2025-06-17 PROCEDURE — 3074F SYST BP LT 130 MM HG: CPT | Performed by: NURSE PRACTITIONER

## 2025-06-17 PROCEDURE — 99211 OFF/OP EST MAY X REQ PHY/QHP: CPT

## 2025-06-17 PROCEDURE — 99214 OFFICE O/P EST MOD 30 MIN: CPT | Performed by: NURSE PRACTITIONER

## 2025-06-17 RX ORDER — LEVOTHYROXINE SODIUM 75 UG/1
75 TABLET ORAL DAILY
COMMUNITY

## 2025-06-17 RX ORDER — FERROUS SULFATE 325(65) MG
325 TABLET, DELAYED RELEASE (ENTERIC COATED) ORAL
COMMUNITY

## 2025-06-17 NOTE — PATIENT INSTRUCTIONS
Return to clinic for follow up in one year with US prior  Notify the office of any new or worsening symptoms